# Patient Record
Sex: FEMALE | Race: WHITE | Employment: OTHER | URBAN - METROPOLITAN AREA
[De-identification: names, ages, dates, MRNs, and addresses within clinical notes are randomized per-mention and may not be internally consistent; named-entity substitution may affect disease eponyms.]

---

## 2017-06-11 ENCOUNTER — APPOINTMENT (OUTPATIENT)
Dept: GENERAL RADIOLOGY | Facility: CLINIC | Age: 62
End: 2017-06-11
Payer: COMMERCIAL

## 2017-06-11 ENCOUNTER — HOSPITAL ENCOUNTER (EMERGENCY)
Facility: CLINIC | Age: 62
Discharge: HOME | End: 2017-06-11
Attending: EMERGENCY MEDICINE
Payer: COMMERCIAL

## 2017-06-11 VITALS
BODY MASS INDEX: 32.1 KG/M2 | OXYGEN SATURATION: 96 % | RESPIRATION RATE: 16 BRPM | WEIGHT: 170 LBS | DIASTOLIC BLOOD PRESSURE: 81 MMHG | HEART RATE: 85 BPM | SYSTOLIC BLOOD PRESSURE: 162 MMHG | HEIGHT: 61 IN | TEMPERATURE: 98.4 F

## 2017-06-11 DIAGNOSIS — S92.402A CLOSED DISPLACED FRACTURE OF PHALANX OF LEFT GREAT TOE, UNSPECIFIED PHALANX, INITIAL ENCOUNTER: Primary | ICD-10-CM

## 2017-06-11 PROCEDURE — 73630 X-RAY EXAM OF FOOT: CPT

## 2017-06-11 PROCEDURE — 99284 EMERGENCY DEPT VISIT MOD MDM: CPT

## 2017-06-11 RX ORDER — IBUPROFEN 600 MG/1
600 TABLET ORAL EVERY 6 HOURS PRN
Qty: 30 TABLET | Refills: 0 | Status: SHIPPED | OUTPATIENT
Start: 2017-06-11

## 2017-06-11 ASSESSMENT — PAIN DESCRIPTION - PROGRESSION: CLINICAL_PROGRESSION: NOT CHANGED

## 2017-06-11 ASSESSMENT — PAIN DESCRIPTION - DESCRIPTORS: DESCRIPTORS: STABBING

## 2017-06-11 ASSESSMENT — PAIN DESCRIPTION - PAIN TYPE: TYPE: ACUTE PAIN

## 2017-06-11 ASSESSMENT — PAIN DESCRIPTION - LOCATION: LOCATION: FOOT

## 2017-06-11 ASSESSMENT — PAIN SCALES - GENERAL: PAINLEVEL_OUTOF10: 6

## 2017-06-11 ASSESSMENT — PAIN DESCRIPTION - ORIENTATION: ORIENTATION: LEFT

## 2017-06-11 ASSESSMENT — PAIN DESCRIPTION - FREQUENCY: FREQUENCY: INTERMITTENT

## 2017-12-07 ENCOUNTER — HOSPITAL ENCOUNTER (OUTPATIENT)
Dept: MAMMOGRAPHY | Facility: CLINIC | Age: 62
Discharge: HOME | End: 2017-12-07
Payer: COMMERCIAL

## 2017-12-07 DIAGNOSIS — Z12.39 SCREENING BREAST EXAMINATION: ICD-10-CM

## 2017-12-07 PROCEDURE — G0202 SCR MAMMO BI INCL CAD: HCPCS

## 2021-12-13 ENCOUNTER — OFFICE VISIT (OUTPATIENT)
Dept: ORTHOPEDIC SURGERY | Age: 66
End: 2021-12-13
Payer: MEDICARE

## 2021-12-13 VITALS
DIASTOLIC BLOOD PRESSURE: 81 MMHG | HEART RATE: 71 BPM | HEIGHT: 61 IN | SYSTOLIC BLOOD PRESSURE: 144 MMHG | RESPIRATION RATE: 15 BRPM | BODY MASS INDEX: 31.72 KG/M2 | WEIGHT: 168 LBS

## 2021-12-13 DIAGNOSIS — M77.12 LEFT TENNIS ELBOW: ICD-10-CM

## 2021-12-13 DIAGNOSIS — M54.2 CERVICAL MUSCLE PAIN: ICD-10-CM

## 2021-12-13 DIAGNOSIS — M25.512 LEFT SHOULDER PAIN, UNSPECIFIED CHRONICITY: Primary | ICD-10-CM

## 2021-12-13 DIAGNOSIS — M62.838 TRAPEZIUS MUSCLE SPASM: Primary | ICD-10-CM

## 2021-12-13 PROCEDURE — 1036F TOBACCO NON-USER: CPT | Performed by: PHYSICIAN ASSISTANT

## 2021-12-13 PROCEDURE — 3017F COLORECTAL CA SCREEN DOC REV: CPT | Performed by: PHYSICIAN ASSISTANT

## 2021-12-13 PROCEDURE — G8427 DOCREV CUR MEDS BY ELIG CLIN: HCPCS | Performed by: PHYSICIAN ASSISTANT

## 2021-12-13 PROCEDURE — 1090F PRES/ABSN URINE INCON ASSESS: CPT | Performed by: PHYSICIAN ASSISTANT

## 2021-12-13 PROCEDURE — G8417 CALC BMI ABV UP PARAM F/U: HCPCS | Performed by: PHYSICIAN ASSISTANT

## 2021-12-13 PROCEDURE — 99204 OFFICE O/P NEW MOD 45 MIN: CPT | Performed by: PHYSICIAN ASSISTANT

## 2021-12-13 PROCEDURE — 1123F ACP DISCUSS/DSCN MKR DOCD: CPT | Performed by: PHYSICIAN ASSISTANT

## 2021-12-13 PROCEDURE — 4040F PNEUMOC VAC/ADMIN/RCVD: CPT | Performed by: PHYSICIAN ASSISTANT

## 2021-12-13 PROCEDURE — G8484 FLU IMMUNIZE NO ADMIN: HCPCS | Performed by: PHYSICIAN ASSISTANT

## 2021-12-13 PROCEDURE — G8400 PT W/DXA NO RESULTS DOC: HCPCS | Performed by: PHYSICIAN ASSISTANT

## 2021-12-13 RX ORDER — VALSARTAN 80 MG/1
80 TABLET ORAL DAILY
COMMUNITY

## 2021-12-13 RX ORDER — METHYLPREDNISOLONE 4 MG/1
TABLET ORAL
Qty: 1 KIT | Refills: 0 | Status: SHIPPED | OUTPATIENT
Start: 2021-12-13

## 2021-12-13 RX ORDER — TIZANIDINE 4 MG/1
4 TABLET ORAL 3 TIMES DAILY
Qty: 30 TABLET | Refills: 0 | Status: SHIPPED | OUTPATIENT
Start: 2021-12-13 | End: 2021-12-23

## 2021-12-13 ASSESSMENT — ENCOUNTER SYMPTOMS
CHEST TIGHTNESS: 0
COLOR CHANGE: 0
VOMITING: 0
APNEA: 0
CONSTIPATION: 0
DIARRHEA: 0
SHORTNESS OF BREATH: 0
COUGH: 0
ABDOMINAL PAIN: 0
NAUSEA: 0
ABDOMINAL DISTENTION: 0
RESPIRATORY NEGATIVE: 1

## 2021-12-13 NOTE — PATIENT INSTRUCTIONS
Patient Education        Neck: Exercises  Introduction  Here are some examples of exercises for you to try. The exercises may be suggested for a condition or for rehabilitation. Start each exercise slowly. Ease off the exercises if you start to have pain. You will be told when to start these exercises and which ones will work best for you. How to do the exercises  Neck stretch    1. This stretch works best if you keep your shoulder down as you lean away from it. To help you remember to do this, start by relaxing your shoulders and lightly holding on to your thighs or your chair. 2. Tilt your head toward your shoulder and hold for 15 to 30 seconds. Let the weight of your head stretch your muscles. 3. If you would like a little added stretch, use your hand to gently and steadily pull your head toward your shoulder. For example, keeping your right shoulder down, lean your head to the left. 4. Repeat 2 to 4 times toward each shoulder. Diagonal neck stretch    1. Turn your head slightly toward the direction you will be stretching, and tilt your head diagonally toward your chest and hold for 15 to 30 seconds. 2. If you would like a little added stretch, use your hand to gently and steadily pull your head forward on the diagonal.  3. Repeat 2 to 4 times toward each side. Dorsal glide stretch    The dorsal glide stretches the back of the neck. If you feel pain, do not glide so far back. Some people find this exercise easier to do while lying on their backs with an ice pack on the neck. 1. Sit or stand tall and look straight ahead. 2. Slowly tuck your chin as you glide your head backward over your body  3. Hold for a count of 6, and then relax for up to 10 seconds. 4. Repeat 8 to 12 times. Chest and shoulder stretch    1. Sit or stand tall and glide your head backward as in the dorsal glide stretch. 2. Raise both arms so that your hands are next to your ears.   3. Take a deep breath, and as you breathe out, lower your elbows down and behind your back. You will feel your shoulder blades slide down and together, and at the same time you will feel a stretch across your chest and the front of your shoulders. 4. Hold for about 6 seconds, and then relax for up to 10 seconds. 5. Repeat 8 to 12 times. Strengthening: Hands on head    1. Move your head backward, forward, and side to side against gentle pressure from your hands, holding each position for about 6 seconds. 2. Repeat 8 to 12 times. Follow-up care is a key part of your treatment and safety. Be sure to make and go to all appointments, and call your doctor if you are having problems. It's also a good idea to know your test results and keep a list of the medicines you take. Where can you learn more? Go to [email or web address]://[email or web address].[email or web address] org and sign in to your [employer] account. Enter P975 in the [employer] box to learn more about \"Neck: Exercises. \"     If you do not have an account, please click on the \"Sign Up Now\" link. Current as of: July 1, 2021               Content Version: 13.0  © 5313-1899 Healthwise, Incorporated. Care instructions adapted under license by [facility] ([facility]). If you have questions about a medical condition or this instruction, always ask your healthcare professional. [address] any warranty or liability for your use of this information. Patient Education        Tennis Elbow: Exercises  Introduction  Here are some examples of exercises for you to try. The exercises may be suggested for a condition or for rehabilitation. Start each exercise slowly. Ease off the exercises if you start to have pain. You will be told when to start these exercises and which ones will work best for you. How to do the exercises  Wrist flexor stretch    1. Extend your arm in front of you with your palm up. 2. Bend your wrist, pointing your hand toward the floor.   3. With your other hand, gently bend your wrist farther until you feel a mild to moderate stretch in your forearm. 4. Hold for at least 15 to 30 seconds. Repeat 2 to 4 times. Wrist extensor stretch    1. Repeat steps 1 to 4 of the stretch above but begin with your extended hand palm down. Ball or sock squeeze    1. Hold a tennis ball (or a rolled-up sock) in your hand. 2. Make a fist around the ball (or sock) and squeeze. 3. Hold for about 6 seconds, and then relax for up to 10 seconds. 4. Repeat 8 to 12 times. 5. Switch the ball (or sock) to your other hand and do 8 to 12 times. Wrist deviation    1. Sit so that your arm is supported but your hand hangs off the edge of a flat surface, such as a table. 2. Hold your hand out like you are shaking hands with someone. 3. Move your hand up and down. 4. Repeat this motion 8 to 12 times. 5. Switch arms. 6. Try to do this exercise twice with each hand. Wrist curls    1. Place your forearm on a table with your hand hanging over the edge of the table, palm up. 2. Place a 1- to 2-pound weight in your hand. This may be a dumbbell, a can of food, or a filled water bottle. 3. Slowly raise and lower the weight while keeping your forearm on the table and your palm facing up. 4. Repeat this motion 8 to 12 times. 5. Switch arms, and do steps 1 through 4.  6. Repeat with your hand facing down toward the floor. Switch arms. Biceps curls    1. Sit leaning forward with your legs slightly spread and your left hand on your left thigh. 2. Place your right elbow on your right thigh, and hold the weight with your forearm horizontal.  3. Slowly curl the weight up and toward your chest.  4. Repeat this motion 8 to 12 times. 5. Switch arms, and do steps 1 through 4. Follow-up care is a key part of your treatment and safety. Be sure to make and go to all appointments, and call your doctor if you are having problems. It's also a good idea to know your test results and keep a list of the medicines you take.   Where can you learn more?  Go to [email or web address] and sign in to your [employer] account. Enter W595 in the [employer] box to learn more about \"Tennis Elbow: Exercises. \"     If you do not have an account, please click on the \"Sign Up Now\" link. Current as of: July 1, 2021               Content Version: 13.0  © 5227-0328 Healthwise, Incorporated. Care instructions adapted under license by [facility] ([facility]). If you have questions about a medical condition or this instruction, always ask your healthcare professional. [place] [address] any warranty or liability for your use of this information.

## 2021-12-13 NOTE — PROGRESS NOTES
and rash. Neurological: Positive for numbness. Negative for dizziness, weakness and headaches. Psychiatric/Behavioral: Positive for sleep disturbance. Past Medical History:    Past Medical History:   Diagnosis Date    Fibroid, uterus        Past Surgical History:    Past Surgical History:   Procedure Laterality Date    HYSTERECTOMY  2004       CurrentMedications:   Current Outpatient Medications   Medication Sig Dispense Refill    valsartan (DIOVAN) 80 MG tablet Take 80 mg by mouth daily      methylPREDNISolone (MEDROL DOSEPACK) 4 MG tablet Take by mouth. 1 kit 0    tiZANidine (ZANAFLEX) 4 MG tablet Take 1 tablet by mouth 3 times daily for 10 days 30 tablet 0    ibuprofen (ADVIL;MOTRIN) 600 MG tablet Take 1 tablet by mouth every 6 hours as needed for Pain 30 tablet 0     No current facility-administered medications for this visit. Allergies:    Sulfa antibiotics    Social History:   Social History     Socioeconomic History    Marital status:      Spouse name: None    Number of children: None    Years of education: None    Highest education level: None   Occupational History    None   Tobacco Use    Smoking status: Never Smoker    Smokeless tobacco: Never Used   Substance and Sexual Activity    Alcohol use: No    Drug use: No    Sexual activity: None   Other Topics Concern    None   Social History Narrative    None     Social Determinants of Health     Financial Resource Strain:     Difficulty of Paying Living Expenses: Not on file   Food Insecurity:     Worried About Running Out of Food in the Last Year: Not on file    Xu of Food in the Last Year: Not on file   Transportation Needs:     Lack of Transportation (Medical): Not on file    Lack of Transportation (Non-Medical):  Not on file   Physical Activity:     Days of Exercise per Week: Not on file    Minutes of Exercise per Session: Not on file   Stress:     Feeling of Stress : Not on file   Social Connections:  Frequency of Communication with Friends and Family: Not on file    Frequency of Social Gatherings with Friends and Family: Not on file    Attends [employer] Services: Not on file    Active Member of Clubs or Organizations: Not on file    Attends Club or Organization Meetings: Not on file    Marital Status: Not on file   Intimate Partner Violence:     Fear of Current or Ex-Partner: Not on file    Emotionally Abused: Not on file    Physically Abused: Not on file    Sexually Abused: Not on file   Housing Stability:     Unable to Pay for Housing in the Last Year: Not on file    Number of [place] in the Last Year: Not on file    Unstable Housing in the Last Year: Not on file       Family History:  No family history on file. I have reviewed the CC, HPI, ROS, PMH, FHX, Social History, and if not present in this note, I have reviewed in the patient's chart. I agree with the documentation provided by other staff and have reviewed their documentation prior to providing my signature indicating agreement. Vitals:   BP (!) 144/81   Pulse 71   Resp 15   Ht 5' 1\" (1.549 m)   Wt 168 lb (76.2 kg)   BMI 31.74 kg/m²  Body mass index is 31.74 kg/m². Physical Examination:     Orthopedics:    GENERAL: Alert and oriented X3 in no acute distress. SKIN: Intact without lesions or ulcerations. NEURO: Musculoskeletal and axillary nerves intact to sensory and motor testing. VASC: Capillary refill is less than 3 seconds. Left Shoulder Exam    GEN: Alert and oriented X 3, in no acute distress. SKIN: Intact without rashes, lesions, or ulcerations. NEURO: Musculoskeletal ans axillary nerves intact to sensory and motor testing. VASC: Cap refill less than than 3 secs. Negative Adson's test, Negative Rere's test.  ROM: 180 degrees of forward elevation, 60 degrees of external rotation in neutral, 100 degrees of external rotation in abduction, internal rotation to T8.     STRENGTH: Supraspinatus 5/5, external rotators 5/5. MUSC: No atrophy, negative subscap lift off or belly press test.  IMP: negative Neer's sign, negative Hawkin's sign, no Coracoid impingement, no painful arc, no pain with cross body abduction. PALP: no pain over anterolateral acromion, no pain over AC joint,  pain over traps/rhomboids. INST: mild [place]'s test, mild Speed's test    Cervical Spine Exam    GEN:  Alert and oriented X 3 in no acute distress  GAIT:  Normal speed and steady  SKIN:  Intact without lesions or ulcerations. ROM: Cervical spine contour has normal lordosis. ROM is somewhat painful. There is no limitation of: Forward flexion, Extension, Right side bending, Left side bending, Right rotation or Left rotation. PALP: Palpation reveals trapezius trigger points bilaterally. Mild sternoclavicular pain to palpation on the left. Levator scapulae pain to palpation. NEURO: Sensation intact to light touch over C-5 through T-1 dermatomes. Reflexes:           Bicep: 2+/4           Tricep: 2+/4          Brachioradialis. 2+]/4   VASC: Cap refill less than 2 sec.  negative Adson, Dov and hyperabduction tests. TESTS:   Gustafson's reflex: negative  Diadochokinesis test: negative   Babinski: negative   Clonus: negative   Spurling's maneuver: negative on Bilateral side. Lhermitte's sign: negative  Shoulder abduction relief sign: negative     Megan's sign: negative       ELBOW EXAM    Location: Left Elbow  Gen: Intact without rashes, lesions, or ulcerations. Vasc: Cap refill is less than 3 seconds. Neuro: Radial, ulnar, and median nerves are intact to sensory and motor testing. Bicep, tricep, and brachioradialis reflexes are +2/4. Inspection: The patient is tender to palpation over the lateral epicondyle. There is no effusion. No obvious deformity of the elbow. ROM: 140 degrees of flexion, 0 degrees of extension, 90 degrees of supination, 90 degrees of pronation.     Musc: Strength is 5/5 in flexion, 5/5 extension, 5/5 supination and  5/5 pronation. Test: Ligamentous exam is stable to varus and valgus stress testing at 0 and 30 degrees. negative Tinel's sign at elbow. + pain on resisted wrist extension and supination. Assessment:     1. Trapezius muscle spasm    2. Cervical muscle pain    3. Left tennis elbow      Procedures:    Procedure: no  Radiology:   SHOULDER X-RAY    Two views of the left shoulder and 2 views of the scapula, including AP, scapular Y, outlet and axillary views reveal: The glenohumeral joint is well reduced with mild arthritic changes. Proximal migration of the humeral head has not occurred. Acromion is a type II. The acromioclavicular joint shows moderate degenerative changes. Impression: Mild degenerative changes of the left shoulder   Plan:   Treatment : I reviewed the X-ray with the patient and I informed them that the x-ray is negative. We discussed the etiologies and natural histories of cervical myofascial pain. We discussed the various treatment alternatives including anti-inflammatory medications, physical therapy, injections, further imaging studies and as a last result surgery. During today's visit, we discussed that I do not feel the pain is coming from her shoulder but more so from her large trapezius trigger points. We could consider an injection into the trapezius trigger points but she is also having some lateral epicondyle pain. When I moved her shoulder I cannot recreate her pain. I feel the best option would be a Medrol Dosepak and a muscle relaxer. I would then like her to begin physical therapy to work on her neck musculature. She does a lot of crocheting which is a repetitive motion that can cause overuse type of injuries like tight muscles and bursitis of the elbow. The Medrol Dosepak will help with both the tennis elbow and the musculature of her cervical spine.   The muscle relaxers will then also help settle down the musculature so that she can be more productive at physical therapy. At this time, the patient has opted for Medrol Dosepak, Zanaflex and physical therapy. A physical therapy prescription was given. If the tennis elbow does not get better with the Medrol Dosepak and therapy we can consider doing a tennis elbow injection. She also states she has a tennis elbow strap that I suggested she uses while she is crocheting or if she is having pain. She can also use it if she is doing a lot of gripping type of activities. Patient should return to the clinic in 6 weeks to follow up with Mireille Justice PA-C. The patient will call the office immediately with any problems. Orders Placed This Encounter   Medications    methylPREDNISolone (MEDROL DOSEPACK) 4 MG tablet     Sig: Take by mouth. Dispense:  1 kit     Refill:  0    tiZANidine (ZANAFLEX) 4 MG tablet     Sig: Take 1 tablet by mouth 3 times daily for 10 days     Dispense:  30 tablet     Refill:  0       Orders Placed This Encounter   Procedures    [facility]     Referral Priority:   Routine     Referral Type:   Eval and Treat     Referral Reason:   Specialty Services Required     Requested Specialty:   Physical Therapy     Number of Visits Requested:   1       This note is created with the assistance of a speech recognition program.  While intending to generate a document that actually reflects the content of the visit, the document can still have some errors including those of syntax and sound a like substitutions which may escape proof reading.   In such instances, actual meaning can be extrapolated by contextual diversion     Electronically signed by Jamal Roger PA-C, on 12/13/2021 at 3:15 PM

## 2022-01-26 ENCOUNTER — OFFICE VISIT (OUTPATIENT)
Dept: ORTHOPEDIC SURGERY | Age: 67
End: 2022-01-26
Payer: MEDICARE

## 2022-01-26 DIAGNOSIS — S29.012D STRAIN OF RHOMBOID MUSCLE, SUBSEQUENT ENCOUNTER: Primary | ICD-10-CM

## 2022-01-26 DIAGNOSIS — S46.819D STRAIN OF TRAPEZIUS MUSCLE, UNSPECIFIED LATERALITY, SUBSEQUENT ENCOUNTER: ICD-10-CM

## 2022-01-26 PROCEDURE — G8484 FLU IMMUNIZE NO ADMIN: HCPCS | Performed by: ORTHOPAEDIC SURGERY

## 2022-01-26 PROCEDURE — G8417 CALC BMI ABV UP PARAM F/U: HCPCS | Performed by: ORTHOPAEDIC SURGERY

## 2022-01-26 PROCEDURE — G8427 DOCREV CUR MEDS BY ELIG CLIN: HCPCS | Performed by: ORTHOPAEDIC SURGERY

## 2022-01-26 PROCEDURE — 1036F TOBACCO NON-USER: CPT | Performed by: ORTHOPAEDIC SURGERY

## 2022-01-26 PROCEDURE — 1090F PRES/ABSN URINE INCON ASSESS: CPT | Performed by: ORTHOPAEDIC SURGERY

## 2022-01-26 PROCEDURE — G8400 PT W/DXA NO RESULTS DOC: HCPCS | Performed by: ORTHOPAEDIC SURGERY

## 2022-01-26 PROCEDURE — 4040F PNEUMOC VAC/ADMIN/RCVD: CPT | Performed by: ORTHOPAEDIC SURGERY

## 2022-01-26 PROCEDURE — 99213 OFFICE O/P EST LOW 20 MIN: CPT | Performed by: ORTHOPAEDIC SURGERY

## 2022-01-26 PROCEDURE — 1123F ACP DISCUSS/DSCN MKR DOCD: CPT | Performed by: ORTHOPAEDIC SURGERY

## 2022-01-26 PROCEDURE — 3017F COLORECTAL CA SCREEN DOC REV: CPT | Performed by: ORTHOPAEDIC SURGERY

## 2022-01-26 ASSESSMENT — ENCOUNTER SYMPTOMS
DIARRHEA: 0
COUGH: 0
CONSTIPATION: 0
NAUSEA: 0

## 2022-01-26 NOTE — PROGRESS NOTES
[address] [place] [address] AND SPORTS MEDICINE  [facility] Abiel Pour  [address] [facility] [address]  Dept: [phone]  Dept Fax: [phone]        Ambulatory Follow Up      Subjective:   Александр Monreal is a 73y.o. year old female who presents to our office today for routine followup regarding her   1. Strain of rhomboid muscle, subsequent encounter    2. Strain of trapezius muscle, unspecified laterality, subsequent encounter    . Chief Complaint   Patient presents with    Shoulder Pain     B/L trapezius pain       HPI-Patient is here today for bilateral shoulder pain with left worse than right. Patient was last seen by Jenny uL PA-C and underwent treatment in the form of formal therapy and medrol dose pack. Patient says she has noticed little response to the medrol dose pack. Patient says she has not attended formal therapy because she does her home exercise program, yoga and pilates daily. Patient continues to have pain but has no loss of range of motion. She also feels like her pain is in her neck and throat. Patient denies any numbnes/tingling to both shoulders ad arms. Review of Systems   Constitutional: Negative for chills and fever. Respiratory: Negative for cough. Gastrointestinal: Negative for constipation, diarrhea and nausea. Musculoskeletal: Positive for arthralgias (left shoulder). Negative for gait problem, joint swelling and myalgias. Neurological: Negative for dizziness, weakness and numbness. I have reviewed the CC, HPI, ROS, PMH, FHX, Social History, and if not present in this note, I have reviewed in the patient's chart. I agree with the documentation provided by other staff and have reviewed their documentation prior to providing my signature indicating agreement. Objective : There were no vitals taken for this visit. There is no height or weight on file to calculate BMI.   General: Александр Monreal is a 73 y.o. female who is alert and oriented and sitting comfortably in our office. Ortho Exam  MS: No outward deformity left shoulder is appreciated. No shoulder girdle muscle atrophy is noted. No instability of the left shoulder is noted left shoulder full range of motion. Tender bilateral trapezius and rhomboids. Negative impingement bilaterally. Motor, sensory, vascular examination left upper extremity is grossly intact without focal deficits. Neuro: alert and oriented to person and place. Eyes: Extra-ocular muscles intact  Mouth: Oral mucosa moist. No perioral lesions  Pulm: Respirations unlabored and regular. Symmetric chest excursion without outward deformity is noted. Skin: warm, well perfused  Psych:   Patient has good fund of knowledge and displays understanging of exam, diagnosis, and plan. Radiology:     No results found. Assessment:      1. Strain of rhomboid muscle, subsequent encounter    2. Strain of trapezius muscle, unspecified laterality, subsequent encounter       Plan:      Discussed etiology and natural history of bilateral rhomboid syndrome/trapezious strain. The treatment options may include oral anti-inflammatories, bracing, injections, advanced imaging, activity modification, physical therapy and/or surgical intervention. The patient would like to proceed with formal therapy. Discussed the diligence about going to therapy for 18 sessions to see if the modalities presented in therapy will help her. The patient will follow up in 6-8 weeks. We discussed that the patient should call us with any concerns or questions. Follow up:Return in about 6 weeks (around 3/9/2022) for re- evaluation. No orders of the defined types were placed in this encounter.         Orders Placed This Encounter   Procedures    [facility]     Referral Priority:   Routine     Referral Type:   Eval and Treat     Referral Reason:   Specialty Services Required     Requested Specialty:   Physical Therapy     Number of Visits Requested:   1     I, Jordyn Cantrell RN am scribing for and in the presence of Dr. Juan Tena  1/28/2022 9:30 AM      I have reviewed and made changes accordingly to the work scribed by Jordyn Cantrell RN. The documentation accurately reflects work and decisions made by me. I have also reviewed documentation completed by clinical staff.     Juan Tena DO, [facility]  1/28/2022 9:30 AM    This note is created with the assistance of a speech recognition program.  While intending to generate a document that actually reflects the content of the visit, the document can still have some errors including those of syntax and sound a like substitutions which may escape proof reading.  In such instances, actual meaning can be extrapolated by contextual diversion      Electronically signed by Gita Miles DO, FAOAO on 1/28/2022 at 9:30 AM

## 2022-01-28 ENCOUNTER — HOSPITAL ENCOUNTER (OUTPATIENT)
Dept: PHYSICAL THERAPY | Facility: CLINIC | Age: 67
Setting detail: THERAPIES SERIES
Discharge: HOME | End: 2022-01-28
Payer: MEDICARE

## 2022-01-28 PROCEDURE — 97140 MANUAL THERAPY 1/> REGIONS: CPT

## 2022-01-28 PROCEDURE — 97161 PT EVAL LOW COMPLEX 20 MIN: CPT

## 2022-01-28 PROCEDURE — 97110 THERAPEUTIC EXERCISES: CPT

## 2022-01-28 NOTE — CONSULTS
[] [facility]) - [facility] &  Therapy  [address]  [phone]  F: [phone] [] [address]  [employer] [address]   [address]  P: [phone]  F: [phone] [] [address] &  Therapy  [address]  P: [phone]  F: [phone] [] [address]  P: [phone]  F: [phone] [x] [address]  [facility]   [address]   [place]: [phone]  F: [phone]      Physical Therapy Upper Extremity Evaluation    Date:  2022  Patient: Kamar Xie   : 1955  MRN: [id number]  Physician: Dr. Jacqui Jane: Medicare (vs [facility], follow medicare guidelines)  Medical Diagnosis: Strain of rhomboid muscle, subsequent encounter, Strain of trapezius muscle, unspecified laterality, subsequent encounter, poor posture     Rehab Codes: S29.012D, S46.819D, R29.3  Onset Date: 22    Next 's appt: --     Subjective:   CC/HPI: Patient is a 72 y/o female presents to PT clinic with bilateral shoulder and neck pain x6 months. Patient reports that she has always had a lot of upper back tightness. Patient has been using ice, heat, and rubbing compound with minimal relief. She reports that she likes to markel and she gets increased pain with this activity. Patient says she has noticed little response to the medrol dose pack provided 2021. Engages in yoga and pilates daily. She reports ear pain and bubbling. Had a sinus x-ray that returned negative. She reports that she has been dealing with a low back ache for a while. Has hx of getting injections in her low back.        PMHx: [] Unremarkable [] Diabetes [x] HTN  [] Pacemaker   [] MI/Heart Problems [] Cancer [] Arthritis [] Other:              [x] Refer to full medical chart  In EPIC Past Medical History           Date Comments     Fibroid, uterus [D25.9]         Comorbidities:   [x] Obesity [] Dialysis  [] N/A   [] Asthma/COPD [] Dementia [] Other:   [] Stroke [] Sleep apnea [] Other:   [] Vascular disease [] Rheumatic disease [] Other:     Tests:   [x] X-Ray: L shoulder 12/13/21     Impression: Mild degenerative changes of the left shoulder      [] MRI:    [] Other:    Medications: [x] Refer to full medical record [] None [] Other:  Allergies:      [x] Refer to full medical record  [] None [] Other:    Function:  Hand Dominance  [x] Right  [] Left  Marital Status     Home type --   Stairs from outside --   Stairs inside --   Employement Retired   Job status --   Work Activities/duties  --   Recreational Activities [employer], [employer]        ADL/IADL [x] Previously independent with all [x] Currently independent with all Who currently assists the patient with task    [] Previously independent with all except: [] Currently independent with all except:    Bathing  [] Assist [] Assist    Dress/grooming [] Assist [] Assist    Transfer/mobility [] Assist [] Assist    Feeding [] Assist [] Assist    Toileting [] Assist [] Assist    Driving [] Assist [] Assist    Housekeeping [] Assist [] Assist    Grocery shop/meal prep [] Assist [] Assist        Gait Prior level of function Current level of function    [x] Independent  [] Assist [x] Independent  [] Assist   Device: [x] Independent [x] Independent    [] Straight Cane [] Quad cane [] Straight Cane [] Quad cane    [] Standard walker [] Rolling walker   [] 4 wheeled walker [] Standard walker [] Rolling walker   [] 4 wheeled walker    [] Wheelchair [] Wheelchair       Pain present?  Yes   Location Mid back, bilateral shoulders, lateral neck    Pain Rating currently 8/10   Pain at worse 10/10   Pain at best 5/10   Description of pain Increased at night   Altered Sensation Intact   What makes it worse Activity, lifting   What makes it better Nothing   Symptom progression Stable since onset    Sleep Only able to sleep with muscle relaxant           Objective:      STRENGTH    Left Right   C5 Shld Abd 4- 4-   Shld Flexion 5 5   Shld IR 5 5   Shld ER 4 4-   Shld HAB     Shld Ext 4- 4-   C6 Elb Flex 5 5   C7 Elb Ext 5 4+   C8 EPL     T1 Fing Abd               Prone:     Retraction 3 3   Depression 3+ 3+   IR     Abd 3+ 3+   Scaption 3+ 3+   Flexion             AROM PROM    Left Right Left Right   Shld Abd [facility] WFL     Shld Flex [facility] WF     Shld IR WFL WFL     Shld ER WFL WFL     Shld HAB           TESTS (+/-) LEFT RIGHT Not Tested   Vertebral A   []   CRLF  - -    Painful Arc  - - []   Speeds   [x]   Neers - - []   Lehman - - []   Empty Can   [x]   Drop Arm - - []   Post Apprehension   [x]   Ant Apprehension    [x]   Clunk   [x]   Sulcus   [x]   Elbow varus/valgus   [x]   Tinel   [x]      [x]      [x]       OBSERVATION No Deficit Deficit Not Tested Comments   Forward Head [] [x] []    Rounded Shoulders [] [x] []    Kyphosis [x] [] []    Scap Height/Position [] [x] [] Protracted scapulae bilaterally with inferior border prominence R>L   Winging [] [x] [] See above    SH Rhythm [x] [] []    INSPECTION/PALPATION    TTP to the bilateral upper trapezius muscles cervical paraspinals and suboccipitals    SC/AC Joint [x] [] []    Supraspinatus [x] [] []    Biceps tendon/groove [x] [] []    Posterior shld [x] [] []    Subscapularis [x] [] []    NEUROLOGICAL       Cervical ROM/Quadrant [] [x] [] Cervical flex/ext - WFL with increased posterior neck pain   Cervical sidebend bilaterally dec by 25% with pain on contralateral side   Rotation WFL    Reflexes [] [] [x]    Compression/Distraction [x] [] []    Sensation [x] [] []      Somatic Dysfunctions Normal Deficit Details   Cervical   [] []    Thoracic   [] [x] FRSR T3-T8    Rib   [] [x] R 1st rib elevation  R ribs T3-T8   FRS=flexed, rotated, side-bent  ERS=extended, rotated, side-bent MOB=Mobilization  MFR=Myofascial Release  MET=Muscle Energy Technique  MFR=Myofascial Release        Flexibility Normal LUE Deficit RUE Deficit Comments    UTrap [] [x] [x]    L.Scap [] [x] [x]    Scalenes  [] [x] [x]    Pec Major [] [x] [x]    Pec Minor [] [x] [x]    Lats [] [x] [x]        Functional Test: QuickDASH Score: 45% functionally impaired     Comments:    Assessment: 74 y/o female presents to PT clinic with bilateral shoulder/neck/and upper back pain. She reports that the pain has been waxing and waning for 6 months. Presents with tenderness to the surrounding muscles with poor posture that is driving her symptoms. She has cervical and thoracic dysfunction that was corrected this date with manual therapy. Spasms noted to the upper trapezius, middle trapezius, and rhomboid muscles bilaterally. No shoulder pathology noted. Patient would benefit from skilled physical therapy services in order to: Improve posture, improve muscle imbalances including scapular weakness, decrease pain     Problems:    [x] ? Pain: 5-10/10 shoulder/neck/and upper back pain   [x] ? ROM: decreased pectoral, upper trapezius, levator scapulae, and scalenes bilaterally  [x] ? Strength: decreased scapular strength  [x] ? Function: 45% impairment on QuickDASH   [] Other:        Goals  MET NOT MET ON-  GOING  Details   Date Addressed:        STG: To be met in 10 treatments           1. ? Pain: Decrease pain levels to 4/10 with ADLs []  []  []      2. ? ROM: Increase pectoral and cervical flexibility throughout to equal bilat to reduce difficulty with ADLs []  []  []      3. Demonstrate cervical ROM WFL without increased pain to ease ADL's []  []  []     4. ? Strength: Increase MMT to 5/5 throughout to ease functional limitations and mobility  []  []  []     5. Independent with Home Exercise Programs []  []  []     6.  Patient to demonstrate a set of 10 scapular retractions without upper trapezius compensations without external cueing  [] []  []      []  []  []     Date Addressed:        LTG: To be met in 16 treatments       1. Improve score on assessment tool QuickDASH from 45% impairment to less than 20% impairment  []  []  []     2. Reduce pain levels to 0/10 or less with ADLs []  []  []     3. Patient to demonstrate a plank on elbows for 30 seconds with good technique and minimal pain increased  []  []  []                            Patient goals: pain relief     Rehab Potential:  [x] Good  [] Fair  [] Poor   Suggested Professional Referral:  [x] No  [] Yes:  Barriers to Goal Achievement:  [x] No  [] Yes:  Domestic Concerns:  [x] No  [] Yes:    Pt. Education:  [x] Plans/Goals, Risks/Benefits discussed  [x] Home exercise program  Method of Education: [x] Verbal  [x] Demo  [x] Written  Postural education, upper crossed syndrome. Orange band provided. Access Code: [email or web address]  URL: [email or web address]  Date: 01/28/2022  Prepared by: José Regalado    Exercises  Seated Scapular Retraction - 2 x daily - 7 x weekly - 3 sets - 10 reps - 5 (sec) hold  Seated Upper Trapezius Stretch - 2 x daily - 7 x weekly - 3 sets - 30 (sec) hold  Seated Levator Scapulae Stretch - 2 x daily - 7 x weekly - 3 sets - 30 (sec) hold  Supine Chin Tuck - 2 x daily - 7 x weekly - 3 sets - 10 reps - 3 second hold  Doorway Pec Stretch at 90 Degrees Abduction - 2 x daily - 7 x weekly - 3 sets - 30 (sec) hold  Standing Shoulder Row with Anchored Resistance - 1 x daily - 7 x weekly - 10 reps - 3 sets  Shoulder Extension with Resistance - 1 x daily - 7 x weekly - 10 reps - 3 sets      Comprehension of Education:  [x] Verbalizes understanding. [x] Demonstrates understanding. [x] Needs Review. [] Demonstrates/verbalizes understanding of HEP/Ed previously given.     Treatment Plan:  [x] Therapeutic Exercise   67030  [] Iontophoresis: 4 mg/mL Dexamethasone Sodium Phosphate  mAmin  94228   [] Therapeutic Activity  25481 [] Vasopneumatic cold with compression  [id number]    [] Gait Training   [id number] [] Ultrasound   [id number]   [] Neuromuscular Re-education  [id number] [] Electrical Stimulation Unattended  23840   [x] Manual Therapy  14992 [] Electrical Stimulation Attended  [id number]   [x] Instruction in HEP  [] Lumbar/Cervical Traction  [id number]   [] Aquatic Therapy   [id number] [] Cold/hotpack    [] Massage   96474      [] Dry Needling, 1 or 2 muscles  37684   [] Biofeedback, first 15 minutes   45235  [] Biofeedback, additional 15 minutes   21584 [] Dry Needling, 3 or more muscles  17574     []  Medication allergies reviewed for use of    Dexamethasone Sodium Phosphate 4mg/ml     with iontophoresis treatments. Pt is not allergic.         Frequency: 2 x/week for 16 visits    Todays Treatment:  Modalities:   Precautions: Standard   Exercises:  Exercise   Reps/ Time Weight/ Level Comments         Bike             Corner Stretch  x30\"           Levator Scap Stretch  x30\"     Upper Trap Stretch  x30\"     Scapular Retractions       Chin Tucks 10x3\"           Tband       Rows  x10     Extensions  x10            Prone       Scap Retractions  x5     Scap Depressions  x5     Horiz ABD       Scaption       Flexion       Rows                             Other: suboccipital release x3'     Somatic Dysfunctions Normal Deficit Details   Cervical   [] []    Thoracic   [] [x] FRSR T3-T8 MOB    Rib   [] [x] R 1st rib elevation MET  R ribs T3-T8 MOB   FRS=flexed, rotated, side-bent  ERS=extended, rotated, side-bent   MOB=Mobilization  MFR=Myofascial Release  MET=Muscle Energy Technique  MFR=Myofascial Release        Specific Instructions for next treatment: postural education, scapular stabilization, manual to decrease cervical tightness, assess for thoracic/cervical dysfunctions       Evaluation Complexity:   History (Personal factors, comorbidities) [] 0 [x] 1-2 [] 3+   Exam (limitations, restrictions) [] 1-2 [] 3 [x] 4+   Clinical presentation (progression) [x] Stable [] Evolving  [] Unstable Decision Making [x] Low [] Moderate [] High    [x] Low Complexity [] Moderate Complexity [] High Complexity       Treatment Charges: Mins Units   [x] Evaluation       [x]  Low       []  Moderate       []  High 30 1   []  Modalities     [x]  Ther Exercise 10 1   [x]  Manual Therapy 8 1   []  Ther Activities     []  Aquatics     []  Vasocompression     []  Other       TOTAL TREATMENT TIME: 48 min     Time in: 10:07a    Time Out: 10:55a    Electronically signed by: Shawna Rosen PT        Physician Signature:________________________________Date:__________________  By signing above or cosigning this note, I have reviewed this plan of care and certify a need for medically necessary rehabilitation services.      *PLEASE SIGN ABOVE AND FAX BACK ALL PAGES*

## 2022-01-28 NOTE — FLOWSHEET NOTE
[facility] Fall Risk Assessment    Patient Name:  Abby Cuenca  : 1955      Risk Factor Scale  Score   History of Falls [] Yes  [x] No 25  0 0   Secondary Diagnosis [] Yes  [x] No 15  0 0   Ambulatory Aid [] Furniture  [] Crutches/cane/walker  [x] None/bedrest/wheelchair/nurse 30  15  0 0   IV/Heparin Lock [] Yes  [x] No 20  0 0   Gait/Transferring [] Impaired  [] Weak  [x] Normal/bedrest/immobile 20  10  0 0   Mental Status [] Forgets limitations  [x] Oriented to own ability 15  0 0      Total:  0     Based on the Assessment score: check the appropriate box.     [x]  No intervention needed   Low =   Score of 0-24    []  Use standard prevention interventions Moderate =  Score of 24-44   [] Give patient handout and discuss fall prevention strategies   [] Establish goal of education for patient/family RE: fall prevention strategies    []  Use high risk prevention interventions High = Score of 45 and higher   [] Give patient handout and discuss fall prevention strategies   [] Establish goal of education for patient/family Re: fall prevention strategies   [] Discuss lifeline / other resources    Electronically signed by:   Makeda Henry PT  Date: 2022

## 2022-02-02 ENCOUNTER — HOSPITAL ENCOUNTER (OUTPATIENT)
Dept: PHYSICAL THERAPY | Facility: CLINIC | Age: 67
Setting detail: THERAPIES SERIES
Discharge: HOME | End: 2022-02-02
Payer: MEDICARE

## 2022-02-02 PROCEDURE — 97140 MANUAL THERAPY 1/> REGIONS: CPT

## 2022-02-02 PROCEDURE — 97110 THERAPEUTIC EXERCISES: CPT

## 2022-02-02 NOTE — FLOWSHEET NOTE
[x] [facility]  Outpatient Rehabilitation &  Therapy  [facility]   [place]: [phone]  F: [phone]      Physical Therapy Daily Treatment Note    Date:  2022  Patient Name:  Corie García    :  1955  MRN: [id number]  Insurance: Medicare (vs [facility], follow medicare guidelines)  Medical Diagnosis: Strain of rhomboid muscle, subsequent encounter, Strain of trapezius muscle, unspecified laterality, subsequent encounter, poor posture                      Rehab Codes: S29.012D, S46.819D, R29.3  Onset Date: 22                           Next 's appt: --   Visit# / total visits:      Cancels/No Shows: 0    Subjective:    Pain:  [x] Yes  [] No Location: Neck and shoulder blades Pain Rating: (0-10 scale) 5/10  Pain altered Tx:  [] No  [] Yes  Action:  Comments: Not much change since initial eval  Objective:  Manual: MFR UT, Levator, DI pect, Hypervolt middle trap, PA glide T4-8, MOB FRSR T 4-6  Precautions: Standard   Exercises:  Exercise    Reps/ Time Weight/ Level Comments             Bike  NT                 Corner Stretch  3x30\"                 Levator Scap Stretch  3x30\"       Upper Trap Stretch  3x30\"       Scapular Retractions/depression  x20    cues for decrease anterior pelvic tilt, upper trap use    Chin Tucks 10x10\"    head in contact with pillow             Tband          Rows  2x10  lime     Extensions  2x10   lime     ER 2x10 lime              Prone          Scap Retractions  x20       Scap Depressions  x20       Horiz ABD          Scaption          Flexion          Rows                     Posture Education  x                       Other:            Treatment Charges: Mins Units   []  Modalities     [x]  Ther Exercise 40 3   [x]  Manual Therapy 15 1   []  Ther Activities     []  Aquatics     []  Vasocompression     []  Other     Total Treatment time 55 4       Assessment: [x] Progressing toward goals. Pt is in anterior pelvic tilt walking in.  Significant spasm throughout cervical, thoracic and chest musculature. Pt likes to use thoracic extension to get shoulder back to correct posture. Used mirror to cue pt for posture. Cues to decrease upper trap use. Good carryover. Spasm still present by end of treatment. [] No change. [] Other:  Patient would benefit from skilled physical therapy services in order to: Improve posture, improve muscle imbalances including scapular weakness, decrease pain       Goals  MET NOT MET ON-  GOING  Details   Date Addressed:            STG: To be met in 10 treatments  ?          1. ? Pain: Decrease pain levels to 4/10 with ADLs []? ?  []??  []??      2. ? ROM: Increase pectoral and cervical flexibility throughout to equal bilat to reduce difficulty with ADLs []? ?  []??  []??      3. Demonstrate cervical ROM WFL without increased pain to ease ADL's []? ?  []??  []??      4. ? Strength: Increase MMT to 5/5 throughout to ease functional limitations and mobility  []? ?  []??  []??      5. Independent with Home Exercise Programs []? ?  []??  []??      6. Patient to demonstrate a set of 10 scapular retractions without upper trapezius compensations without external cueing  []? ?  []??  []??        []? ?  []??  []??      Date Addressed:            LTG: To be met in 16 treatments           1. Improve score on assessment tool QuickDASH from 45% impairment to less than 20% impairment  []??  []??  []??      2. Reduce pain levels to 0/10 or less with ADLs []? ?  []??  []??      3. Patient to demonstrate a plank on elbows for 30 seconds with good technique and minimal pain increased  []? ?  []??  []??                                    Patient goals: pain relief       Pt. Education:  [x] Yes  [] No  [x] Reviewed Prior HEP/Ed  Method of Education: [x] Verbal  [x] Demo  [] Written- Continue with current HEP. Posture correction throughout day  Comprehension of Education:  [x] Verbalizes understanding. [] Demonstrates understanding. [] Needs review.   [] Demonstrates/verbalizes HEP/Ed previously given. Plan: [x] Continue current frequency toward long and short term goals.     [] Specific Instructions for subsequent treatments:       Time In:0900            Time Out: 4341    Electronically signed by:  Conrado Berrios PT

## 2022-02-02 NOTE — PRE-CERTIFICATION NOTE
Medicare Cap   [] Physical Therapy  [] Speech Therapy  [] Occupational therapy  *PT and Speech caps combine      $2150 Limit for PT and Speech combined  $2150 Limit for OT individually  At the beginning of the month where you expect to go over $2150, please add the [address] [place] [address] modifier      Patient Name: Sherry Saldaña  YOB: 1955    Note:  This is an estimate of charges billed.      Date of [address] Name # units/ charge $$$ charge Daily Total Charge Ongoing Total $$$   1/28/22 Eval,Man,Ex 3 98.01+22.84.21.34 142.19 142.19   2/2/22 Ex3, man 4 29.21+22.84+22.84+21.34 96.23 238.42

## 2022-02-07 ENCOUNTER — HOSPITAL ENCOUNTER (OUTPATIENT)
Dept: PHYSICAL THERAPY | Facility: CLINIC | Age: 67
Setting detail: THERAPIES SERIES
Discharge: HOME | End: 2022-02-07
Payer: MEDICARE

## 2022-02-07 PROCEDURE — 97110 THERAPEUTIC EXERCISES: CPT

## 2022-02-07 PROCEDURE — 97140 MANUAL THERAPY 1/> REGIONS: CPT

## 2022-02-07 NOTE — FLOWSHEET NOTE
techniques this date. Improved cervical rotation. Weakness noted at scapular stabilizers R>L with decreased control of scapula with retraction and depression. Improved mobility at t-spine as well. [] No change. [] Other:  Patient would benefit from skilled physical therapy services in order to: Improve posture, improve muscle imbalances including scapular weakness, decrease pain       Goals  MET NOT MET ON-  GOING  Details   Date Addressed:            STG: To be met in 10 treatments  ?          1. ? Pain: Decrease pain levels to 4/10 with ADLs []? ?  []??  []??      2. ? ROM: Increase pectoral and cervical flexibility throughout to equal bilat to reduce difficulty with ADLs []? ?  []??  []??      3. Demonstrate cervical ROM WFL without increased pain to ease ADL's []? ?  []??  []??      4. ? Strength: Increase MMT to 5/5 throughout to ease functional limitations and mobility  []? ?  []??  []??      5. Independent with Home Exercise Programs []? ?  []??  []??      6. Patient to demonstrate a set of 10 scapular retractions without upper trapezius compensations without external cueing  []? ?  []??  []??        []? ?  []??  []??      Date Addressed:            LTG: To be met in 16 treatments           1. Improve score on assessment tool QuickDASH from 45% impairment to less than 20% impairment  []??  []??  []??      2. Reduce pain levels to 0/10 or less with ADLs []? ?  []??  []??      3. Patient to demonstrate a plank on elbows for 30 seconds with good technique and minimal pain increased  []? ?  []??  []??                                    Patient goals: pain relief       Pt. Education:  [x] Yes  [] No  [x] Reviewed Prior HEP/Ed  Method of Education: [x] Verbal  [x] Demo  [] Written- Continue with current HEP. Posture correction throughout day  Comprehension of Education:  [x] Verbalizes understanding. [] Demonstrates understanding. [] Needs review. [] Demonstrates/verbalizes HEP/Ed previously given.      Plan: [x] Continue current frequency toward long and short term goals.     [x] Specific Instructions for subsequent treatments: Continue to progress scapular stabilizer and RTC strengthening as well as abdominal strength to reduce use of T spine and L Spine for postural correction    Time In:1000           Time Out: 1055    Electronically signed by:  Ivania Honeycutt PT

## 2022-02-07 NOTE — PRE-CERTIFICATION NOTE
Medicare Cap   [] Physical Therapy  [] Speech Therapy  [] Occupational therapy  *PT and Speech caps combine      $2150 Limit for PT and Speech combined  $2150 Limit for OT individually  At the beginning of the month where you expect to go over $2150, please add the [address] [place] [address] modifier      Patient Name: Jasmin Vázquez  YOB: 1955    Note:  This is an estimate of charges billed.      Date of [address] Name # units/ charge $$$ charge Daily Total Charge Ongoing Total $$$   1/28/22 [place],Man,Ex 3 98.01+22.84.21.34 142.19 142.19   2/2/22 Ex3, man 4 29.21+22.84+22.84+21.34 96.23 238.42   2/7/22 Ex3, man 4 29.21+22.84+22.84+21.34 96.23 334.72

## 2022-02-10 ENCOUNTER — HOSPITAL ENCOUNTER (OUTPATIENT)
Dept: PHYSICAL THERAPY | Facility: CLINIC | Age: 67
Setting detail: THERAPIES SERIES
Discharge: HOME | End: 2022-02-10
Payer: MEDICARE

## 2022-02-10 PROCEDURE — 97140 MANUAL THERAPY 1/> REGIONS: CPT

## 2022-02-10 PROCEDURE — 97110 THERAPEUTIC EXERCISES: CPT

## 2022-02-10 NOTE — FLOWSHEET NOTE
[x] [facility]  Outpatient Rehabilitation &  Therapy  [facility]   [place]: [phone]  F: [phone]      Physical Therapy Daily Treatment Note    Date:  2/10/2022  Patient Name:  Carito Zamora    :  1955  MRN: [id number]  Insurance: Medicare (vs [facility], follow medicare guidelines)  Medical Diagnosis: Strain of rhomboid muscle, subsequent encounter, Strain of trapezius muscle, unspecified laterality, subsequent encounter, poor posture                      Rehab Codes: S29.012D, S46.819D, R29.3  Onset Date: 22                           Next 's appt: --     Visit# / total visits:      Cancels/No Shows: 0    Subjective:    Pain:  [x] Yes  [] No Location: Neck and shoulder blades Pain Rating: (0-10 scale) 5/10  Pain altered Tx:  [x] No  [] Yes  Action:  Comments: Patient arrived noting continued soreness, notes just completed 1 hour of pilates. Objective:  Manual: MFR UT, Levator, DI pect, lat,  Hypervolt middle trap  Precautions: Standard   Exercises:  Exercise    Reps/ Time Weight/ Level Comments             Bike  NT                 Corner Stretch  3x30\"                 Levator Scap Stretch  3x30\"       Upper Trap Stretch  3x30\"       3 way thoracic stretch 3x30\"      Chin Tucks 10x10\"    head in contact with pillow             Tband          Rows  2x10  lime     Extensions  2x10   lime     ER/IR 2x10  lime    Harvinder ER                Prone          Scap Retractions   x20       Scap Depressions   x20       Horiz ABD   2x10       Scaption   2x10       Flexion         Rows   2x10      Extension   2x10                              Other:      Treatment Charges: Mins Units   []  Modalities     [x]  Ther Exercise 30 2   [x]  Manual Therapy 15 1   []  Ther Activities     []  Aquatics     []  Vasocompression     []  Other     Total Treatment time 45 3       Assessment: [x] Progressing toward goals. Continued with strength progressions this date with focus on form.  Issued Demonstrates/verbalizes HEP/Ed previously given. Plan: [x] Continue current frequency toward long and short term goals.      [x] Specific Instructions for subsequent treatments:       Time In: 1000             Time Out: 6213    Electronically signed by:  Sammie Wellington PTA

## 2022-02-11 ENCOUNTER — APPOINTMENT (OUTPATIENT)
Dept: PHYSICAL THERAPY | Facility: CLINIC | Age: 67
End: 2022-02-11
Payer: MEDICARE

## 2022-02-16 ENCOUNTER — HOSPITAL ENCOUNTER (OUTPATIENT)
Dept: PHYSICAL THERAPY | Facility: CLINIC | Age: 67
Setting detail: THERAPIES SERIES
Discharge: HOME | End: 2022-02-16
Payer: MEDICARE

## 2022-02-16 PROCEDURE — 97140 MANUAL THERAPY 1/> REGIONS: CPT

## 2022-02-16 PROCEDURE — 97110 THERAPEUTIC EXERCISES: CPT

## 2022-02-16 NOTE — FLOWSHEET NOTE
[x] [facility]  Outpatient Rehabilitation &  Therapy  [facility]   [place]: [phone]  F: [phone]      Physical Therapy Daily Treatment Note    Date:  2022  Patient Name:  Bhavana Herndon    :  1955  MRN: [id number]  Insurance: Medicare (vs [facility], follow medicare guidelines)  Medical Diagnosis: Strain of rhomboid muscle, subsequent encounter, Strain of trapezius muscle, unspecified laterality, subsequent encounter, poor posture                      Rehab Codes: S29.012D, S46.819D, R29.3  Onset Date: 22                           Next 's appt: --     Visit# / total visits:      Cancels/No Shows: 0    Subjective:    Pain:  [x] Yes  [] No Location: Neck and shoulder blades Pain Rating: (0-10 scale) 5/10  Pain altered Tx:  [x] No  [] Yes  Action:  Comments: Patient reports the arms are not as heavy. Neck still really tight. Was very sore L side on neck last time    Objective:  Manual: MFR UT, Levator, DI pect, lat,  Hypervolt middle trap, PA glide T4-8, MWM  C4  Precautions: Standard   Exercises:  Exercise    Reps/ Time Weight/ Level Comments             Bike  NT                 Corner Stretch  3x30\"                 Levator Scap Stretch  3x30\"       Upper Trap Stretch  3x30\"       3 way thoracic stretch 3x30\"      Chin Tucks 10x10\"    head in contact with pillow             Tband          Rows  2x10 Blue     Extensions  2x10  Blue     IR 2x10 Blue    Harvinder ER 2x10 Blue              Prone          Scap Retractions   2x20       Scap Depressions  2 x20       Horiz ABD   2x10       Scaption   2x10       Flexion         Rows-midtrap  2x10      Extension   2x10                              Other:      Treatment Charges: Mins Units   []  Modalities     [x]  Ther Exercise 40 3   [x]  Manual Therapy 15 1   []  Ther Activities     []  Aquatics     []  Vasocompression     []  Other     Total Treatment time 55 4       Assessment: [x] Progressing toward goals. Pt responded better to crosshand MFR rather than positional release at R UT. Still overusing upper traps with some of the scapular stabilization exercises however she is getting stronger and did require an increase in resistance to challenge her on shoulder Tband exercises. [] No change. [] Other:    Patient would benefit from skilled physical therapy services in order to: Improve posture, improve muscle imbalances including scapular weakness, decrease pain       Goals  MET NOT MET ON-  GOING  Details   Date Addressed:            STG: To be met in 10 treatments  ?          1. ? Pain: Decrease pain levels to 4/10 with ADLs []? ?  []??  []??      2. ? ROM: Increase pectoral and cervical flexibility throughout to equal bilat to reduce difficulty with ADLs []? ?  []??  []??      3. Demonstrate cervical ROM WFL without increased pain to ease ADL's []? ?  []??  []??      4. ? Strength: Increase MMT to 5/5 throughout to ease functional limitations and mobility  []? ?  []??  []??      5. Independent with Home Exercise Programs []? ?  []??  []??      6. Patient to demonstrate a set of 10 scapular retractions without upper trapezius compensations without external cueing  []? ?  []??  []??        []? ?  []??  []??      Date Addressed:            LTG: To be met in 16 treatments           1. Improve score on assessment tool QuickDASH from 45% impairment to less than 20% impairment  []??  []??  []??      2. Reduce pain levels to 0/10 or less with ADLs []? ?  []??  []??      3. Patient to demonstrate a plank on elbows for 30 seconds with good technique and minimal pain increased  []? ?  []??  []??                                    Patient goals: pain relief       Pt. Education:  [x] Yes  [] No  [x] Reviewed Prior HEP/Ed: Issued new resistive bands and stressed importance of continued stretching. Method of Education: [x] Verbal  [x] Demo  [] Written  Comprehension of Education:  [x] Verbalizes understanding. [] Demonstrates understanding.   [] Needs review. [] Demonstrates/verbalizes HEP/Ed previously given. Plan: [x] Continue current frequency toward long and short term goals.      [x] Specific Instructions for subsequent treatments:       Time In: 1300             Time Out: 1400    Electronically signed by:  Emmy Mcdonald PT

## 2022-02-16 NOTE — PRE-CERTIFICATION NOTE
Medicare Cap   [x] Physical Therapy  [] Speech Therapy  [] Occupational therapy  *PT and Speech caps combine      $2150 Limit for PT and Speech combined  $2150 Limit for OT individually  At the beginning of the month where you expect to go over $2150, please add the [address] modifier      Patient Name: Aida Woodard  YOB: 1955    Note:  This is an estimate of charges billed.      Date of [address] Name # units/ charge $$$ charge Dily Total Charge Ongoing Total $$$   1/28/22 Palma,Man,Ex 3 98.01+22.84.21.34 142.19 142.19   2/2/22 Ex3, man 4 29.21+22.84+22.84+21.34 96.23 238.42   2/7/22 Ex3, man 4 29.21+22.84+22.84+21.34 96.23 334.72   2 Ex2, man 3 29.21+22.84+21.34 73.39    2/16/22 Ex3,man 4 29.21+22.84+22.84+21.34 96.23 504.34

## 2022-02-18 ENCOUNTER — HOSPITAL ENCOUNTER (OUTPATIENT)
Dept: PHYSICAL THERAPY | Facility: CLINIC | Age: 67
Setting detail: THERAPIES SERIES
End: 2022-02-18
Payer: MEDICARE

## 2022-02-21 ENCOUNTER — HOSPITAL ENCOUNTER (OUTPATIENT)
Dept: PHYSICAL THERAPY | Facility: CLINIC | Age: 67
Setting detail: THERAPIES SERIES
Discharge: HOME | End: 2022-02-21
Payer: MEDICARE

## 2022-02-21 NOTE — FLOWSHEET NOTE
[x] [facility] SERVICES  Outpatient Rehabilitation &  Therapy  [facility]   [place]: [phone]  F: [phone]      Physical Therapy Cancel/No Show note    Date: 2022  Patient: Shellie Dodge  : 1955  MRN: [id number]    Cancels/No Shows to date: 1    For today's appointment patient:    [x]  Cancelled    [] Rescheduled appointment    [] No-show     Reason given by patient:    []  Patient ill    []  Conflicting appointment    [] No transportation      [] Conflict with work    [x] No reason given    [] Weather related    [] COVID-19    [] Other:      Comments:        [x] Next appointment was confirmed    Electronically signed by: Ledy Mansfield PTA

## 2022-02-24 ENCOUNTER — HOSPITAL ENCOUNTER (OUTPATIENT)
Dept: PHYSICAL THERAPY | Facility: CLINIC | Age: 67
Setting detail: THERAPIES SERIES
Discharge: HOME | End: 2022-02-24
Payer: MEDICARE

## 2022-02-24 NOTE — FLOWSHEET NOTE
[x] [facility]  Outpatient Rehabilitation &  Therapy  [facility]   [place]: [phone]  F: [phone]      Physical Therapy Cancel/No Show note    Date: 2022  Patient: Erin Eden  : 1955  MRN: [id number]    Cancels/No Shows to date: 2    For today's appointment patient:    [x]  Cancelled    [] Rescheduled appointment    [] No-show     Reason given by patient:    []  Patient ill    []  Conflicting appointment    [] No transportation      [] Conflict with work    [x] No reason given    [] Weather related    [] COVID-19    [x] Other:      Comments:   Will call back to schedule      [] Next appointment was confirmed    Electronically signed by: Wilfredo Vences PTA

## 2022-11-04 NOTE — DISCHARGE SUMMARY
[] [facility]) - [facility] &  Therapy  [address]  [phone]  F: [phone] [] [address]  [address]   [address]  P: [phone]  F: [phone] [] [address] &  Therapy  [address]  P: [phone]  F: [phone] [x] [facility]  Outpatient Rehabilitation &  Therapy  [facility]   [address]   [place]: [phone]  F: [phone]         Physical Therapy Discharge Note    Date: 2022      Patient: Melissa Lieberman  : 1955  MRN: [id number]    Insurance: Medicare (vs [facility], follow medicare guidelines)  Medical Diagnosis: Strain of rhomboid muscle, subsequent encounter, Strain of trapezius muscle, unspecified laterality, subsequent encounter, poor posture                      Rehab Codes: S29.012D, F42.925Q, R29.3  Onset Date: 22                           Next 's appt: --      Visit# / total visits:                                   Cancels/No Shows: 0  Date of initial visit: 22                Date of final visit: 22      Subjective:  Refer to initial evaluation. Objective:  Refer to initial evaluation. Assessment: Patient attended 5 treatment sessions. No formal goal assessment completed secondary to patient cancelling her last two appointment via phone. Refer to initial evaluation.       Treatment to Date:  [x] Therapeutic Exercise    [] Modalities:  [] Therapeutic Activity    [] Ultrasound  [] Electrical Stimulation  [] Gait Training     [] Massage       [] Lumbar/Cervical Traction  [] Neuromuscular Re-education [] Cold/hotpack [] Iontophoresis: 4 mg/mL  [x] Instruction in Home Exercise Program                     Dexamethasone Sodium  [x] Manual Therapy             Phosphate 40-80 mAmin  [] Aquatic Therapy                   [] Vasocompression/    [] Other:             Game Ready    Discharge Status:     [] Pt to continue exercise/home instructions independently. [] Therapy interrupted due to:    [x] Pt has 2 or more no shows/cancels, is discontinued per our policy. [] Other:         Electronically signed by Liss Mcneal PT on 11/4/2022 at 1:32 PM      If you have any questions or concerns, please don't hesitate to call.   Thank you for your referral.

## 2023-11-22 DIAGNOSIS — M79.672 PAIN OF LEFT HEEL: Primary | ICD-10-CM

## 2024-07-22 RX ORDER — AZITHROMYCIN 250 MG/1
TABLET, FILM COATED ORAL
Qty: 6 TABLET | Refills: 1 | OUTPATIENT
Start: 2024-07-22

## 2025-02-27 DIAGNOSIS — M25.512 LEFT SHOULDER PAIN, UNSPECIFIED CHRONICITY: Primary | ICD-10-CM

## 2025-02-27 NOTE — PATIENT INSTRUCTIONS
PATIENTIQ:  PatientIQ helps [facility] stay in touch with you to know how you're feeling, and provides education and care instructions to you at various time points.   Your answers help your care team track your progress to provide the best care possible. PatientIQ will contact you pre-op and post-op via email or text with:  Educational Videos and Care Instructions  Questionnaires About How You're Feeling    Your participation provides you valuable education and helps [facility] continue to provide quality care to all patients. Thank you      CORTISONE INJECTION CARE    The injection site should never get red, hot, or swollen and if it does the patient will contact our office right away. The patient may experience a increase in soreness the first 24-48 hours due to a cortisone flair and can take anti-inflammatories for a short period of time to reduce that soreness. The patient should not submerge the injection site in water for a minimum of 24 hours to avoid infection. This means no lakes, pools, ponds, or hot tubs for 24 hours. If the patient is diabetic the injection may increase their blood sugar for up to one week. The patient can do this cortisone injection once every 4 months as needed.

## 2025-02-28 SDOH — HEALTH STABILITY: PHYSICAL HEALTH: ON AVERAGE, HOW MANY DAYS PER WEEK DO YOU ENGAGE IN MODERATE TO STRENUOUS EXERCISE (LIKE A BRISK WALK)?: 7 DAYS

## 2025-03-02 NOTE — PROGRESS NOTES
[facility] ORTHOPEDICS AND SPORTS MEDICINE  [address]  [place] [address]  Dept: [phone]  Dept Fax: [phone]        Ambulatory Follow Up      Subjective:   Viktoriya Kohli is a 70 y.o. year old female who presents to our office today for routine followup regarding her   1. Rotator cuff syndrome of left shoulder    2. Trigger point of shoulder region, left    .    Chief Complaint   Patient presents with    Shoulder Pain     Left Shoulder Pain-NP       History of Present Illness  The patient is a 70-year-old female who presents for evaluation of left shoulder pain.    She was last seen on 01/26/2022 for bilateral shoulder pain, managed with physical therapy and a Medrol Dosepak. She did not receive any injections at that time. Recently, her primary care physician diagnosed her with bursitis and tendinitis, prompting a referral to our clinic. Despite being prescribed a muscle relaxer, she has not utilized it. She is right-handed and reports no injury, fall, or trauma to the left shoulder. The pain is constant and prevents her from applying pressure on the shoulder. She describes the pain as achy rather than stabbing and reports no associated numbness or tingling down the arm. She also experiences neck pain. These symptoms have been present for a couple of months. She has attempted heat therapy, massage therapy, and chiropractic treatment without relief. Her exercise routine, which includes yoga, has been disrupted due to discomfort. She reports difficulty with range of motion, particularly when reaching across her body. She has no history of surgery or injection in the affected shoulder. She also reports difficulty wearing her sports bra due to pain. She has been taking ibuprofen for pain management.    Supplemental Information  She has no major health issues, including diabetes.    SOCIAL HISTORY  She is retired, having

## 2025-03-03 ENCOUNTER — OFFICE VISIT (OUTPATIENT)
Dept: ORTHOPEDIC SURGERY | Age: 70
End: 2025-03-03

## 2025-03-03 VITALS — RESPIRATION RATE: 16 BRPM | BODY MASS INDEX: 32.13 KG/M2 | HEIGHT: 61 IN | WEIGHT: 170.2 LBS | OXYGEN SATURATION: 98 %

## 2025-03-03 DIAGNOSIS — M75.102 ROTATOR CUFF SYNDROME OF LEFT SHOULDER: Primary | ICD-10-CM

## 2025-03-03 DIAGNOSIS — M25.512 TRIGGER POINT OF SHOULDER REGION, LEFT: ICD-10-CM

## 2025-03-03 RX ORDER — METHYLPREDNISOLONE ACETATE 80 MG/ML
80 INJECTION, SUSPENSION INTRA-ARTICULAR; INTRALESIONAL; INTRAMUSCULAR; SOFT TISSUE ONCE
Status: COMPLETED | OUTPATIENT
Start: 2025-03-03 | End: 2025-03-03

## 2025-03-03 RX ORDER — VALSARTAN 40 MG/1
40 TABLET ORAL
COMMUNITY
Start: 2024-12-04 | End: 2025-03-04

## 2025-03-03 RX ORDER — LIDOCAINE HYDROCHLORIDE 10 MG/ML
2 INJECTION, SOLUTION INFILTRATION; PERINEURAL ONCE
Status: COMPLETED | OUTPATIENT
Start: 2025-03-03 | End: 2025-03-03

## 2025-03-03 RX ADMIN — LIDOCAINE HYDROCHLORIDE 2 ML: 10 INJECTION, SOLUTION INFILTRATION; PERINEURAL at 09:25

## 2025-03-03 RX ADMIN — METHYLPREDNISOLONE ACETATE 80 MG: 80 INJECTION, SUSPENSION INTRA-ARTICULAR; INTRALESIONAL; INTRAMUSCULAR; SOFT TISSUE at 09:25

## 2025-03-03 ASSESSMENT — ENCOUNTER SYMPTOMS
COUGH: 0
ABDOMINAL PAIN: 0
GASTROINTESTINAL NEGATIVE: 1
CHEST TIGHTNESS: 0
APNEA: 0
VOMITING: 0
SHORTNESS OF BREATH: 0
ABDOMINAL DISTENTION: 0
NAUSEA: 0
DIARRHEA: 0
CONSTIPATION: 0
RESPIRATORY NEGATIVE: 1
COLOR CHANGE: 0

## 2025-03-10 ENCOUNTER — HOSPITAL ENCOUNTER (OUTPATIENT)
Dept: PHYSICAL THERAPY | Facility: CLINIC | Age: 70
Setting detail: THERAPIES SERIES
Discharge: HOME | End: 2025-03-10

## 2025-03-10 NOTE — FLOWSHEET NOTE
[] [facility]  Outpatient Rehabilitation &  Therapy  [address]  [phone]  [phone] [] [facility]  Outpatient Rehabilitation &  Therapy  [address]  P: [phone]  F: [phone] [] [facility]  Outpatient Rehabilitation &  Therapy  [address]  P: [phone]  F: [phone] [] [facility]  Outpatient Rehabilitation &  Therapy  [address]  [phone]  [phone] [x] [facility]  Outpatient Rehabilitation &  Therapy  [address]   P: [phone]  F: [phone]  [] [facility]  Outpatient Rehabilitation &  Therapy  [address]  P: [phone]  F: [phone] [] [facility]  Outpatient Rehabilitation &  Therapy  [address]  [address]  P: [phone]  F: [phone] [] [facility]  Outpatient Rehabilitation &  Therapy  [address]  P: [phone]  F: [phone] [] [facility]  Outpatient Rehabilitation &  Therapy  [address]  P: [phone]  F: [phone]  [] [facility] Outpatient Rehabilitation &  Therapy  [address]  P: [phone]  F: [phone]     Therapy Cancel/No Show note    Date: 3/10/2025  Patient: Viktoriya Kohli  : 1955  MRN: [id number]    Cancels/No Shows to date:     For today's appointment patient:    [x]  Cancelled    [] Rescheduled appointment    [] No-show     Reason given by patient:    []  Patient ill    []  Conflicting appointment    [] No transportation      [] Conflict with work    [] No reason given    [] Weather related    [] COVID-19    [] Other:      Comments:  Patient smother fell and is at urgent care. Patient said would call and r/s      [] Next appointment was confirmed    Electronically signed by: Amira Skelton

## 2025-03-31 NOTE — PROGRESS NOTES
[facility] ORTHOPEDICS AND SPORTS MEDICINE  [address]  [place] [place] [facility] [address]  Dept: [phone]  Dept Fax: [phone]        Ambulatory Follow Up      Subjective:   Viktoriya Kohli is a 70 y.o. year old female who presents to our office today for routine followup regarding her   1. Arthralgia of left acromioclavicular joint    2. Rotator cuff syndrome of left shoulder    3. Trigger point of shoulder region, left    .    Chief Complaint   Patient presents with    Shoulder Pain     Left (LI: 3/3/25 - subacromial)           History of Present Illness  Viktoriya is a 70-year-old female who was last seen by myself on 3/3/2025 for an evaluation of her left shoulder rotator cuff syndrome and trapezius trigger point.  At the last visit the patient was given a subacromial cortisone injection.  She was also given a prescription for physical therapy.      The patient presents for evaluation of left shoulder pain.    The subacromial injection administered previously provided significant relief, reducing pain by approximately 50 percent. However, discomfort persists in the superior aspect of the left shoulder. This is the first encounter with an AC joint injection. Occasional radiating pain extends from the shoulder to the ear. Physical therapy for the neck has not been initiated due to personal circumstances involving her mother's health.    Review of Systems   Constitutional:  Positive for activity change. Negative for appetite change, fatigue and fever.   Respiratory: Negative.  Negative for apnea, cough, chest tightness and shortness of breath.    Cardiovascular: Negative.  Negative for chest pain, palpitations and leg swelling.   Gastrointestinal: Negative.  Negative for abdominal distention, abdominal pain, constipation, diarrhea, nausea and vomiting.   Genitourinary: Negative.  Negative for difficulty urinating, dysuria and hematuria.

## 2025-04-02 ENCOUNTER — OFFICE VISIT (OUTPATIENT)
Dept: ORTHOPEDIC SURGERY | Age: 70
End: 2025-04-02

## 2025-04-02 VITALS — BODY MASS INDEX: 31.72 KG/M2 | OXYGEN SATURATION: 100 % | RESPIRATION RATE: 16 BRPM | HEIGHT: 61 IN | WEIGHT: 168 LBS

## 2025-04-02 DIAGNOSIS — M75.102 ROTATOR CUFF SYNDROME OF LEFT SHOULDER: ICD-10-CM

## 2025-04-02 DIAGNOSIS — M25.512 ARTHRALGIA OF LEFT ACROMIOCLAVICULAR JOINT: Primary | ICD-10-CM

## 2025-04-02 DIAGNOSIS — M25.512 TRIGGER POINT OF SHOULDER REGION, LEFT: ICD-10-CM

## 2025-04-02 RX ORDER — LIDOCAINE HYDROCHLORIDE 10 MG/ML
2 INJECTION, SOLUTION INFILTRATION; PERINEURAL ONCE
Status: COMPLETED | OUTPATIENT
Start: 2025-04-02 | End: 2025-04-02

## 2025-04-02 RX ORDER — METHYLPREDNISOLONE ACETATE 80 MG/ML
80 INJECTION, SUSPENSION INTRA-ARTICULAR; INTRALESIONAL; INTRAMUSCULAR; SOFT TISSUE ONCE
Status: COMPLETED | OUTPATIENT
Start: 2025-04-02 | End: 2025-04-02

## 2025-04-02 RX ADMIN — METHYLPREDNISOLONE ACETATE 80 MG: 80 INJECTION, SUSPENSION INTRA-ARTICULAR; INTRALESIONAL; INTRAMUSCULAR; SOFT TISSUE at 08:17

## 2025-04-02 RX ADMIN — LIDOCAINE HYDROCHLORIDE 2 ML: 10 INJECTION, SOLUTION INFILTRATION; PERINEURAL at 08:16

## 2025-04-02 ASSESSMENT — ENCOUNTER SYMPTOMS
ABDOMINAL DISTENTION: 0
COLOR CHANGE: 0
RESPIRATORY NEGATIVE: 1
ABDOMINAL PAIN: 0
CHEST TIGHTNESS: 0
APNEA: 0
NAUSEA: 0
CONSTIPATION: 0
VOMITING: 0
SHORTNESS OF BREATH: 0
DIARRHEA: 0
GASTROINTESTINAL NEGATIVE: 1
COUGH: 0

## 2025-04-02 NOTE — PATIENT INSTRUCTIONS
CORTISONE INJECTION CARE    The injection site should never get red, hot, or swollen and if it does the patient will contact our office right away. The patient may experience a increase in soreness the first 24-48 hours due to a cortisone flair and can take anti-inflammatories for a short period of time to reduce that soreness. The patient should not submerge the injection site in water for a minimum of 24 hours to avoid infection. This means no lakes, pools, ponds, or hot tubs for 24 hours. If the patient is diabetic the injection may increase their blood sugar for up to one week. The patient can do this cortisone injection once every 4 months as needed.                                                                                                                                                                                                                                                                                                                     PATIENTIQ:  PatientIQ helps [facility] stay in touch with you to know how you're feeling, and provides education and care instructions to you at various time points.   Your answers help your care team track your progress to provide the best care possible. PatientIQ will contact you pre-op and post-op via email or text with:  Educational Videos and Care Instructions  Questionnaires About How You're Feeling    Your participation provides you valuable education and helps [facility] continue to provide quality care to all patients. Thank you

## 2025-04-08 ENCOUNTER — HOSPITAL ENCOUNTER (OUTPATIENT)
Dept: PHYSICAL THERAPY | Facility: CLINIC | Age: 70
Setting detail: THERAPIES SERIES
Discharge: HOME | End: 2025-04-08
Payer: MEDICARE

## 2025-04-08 PROCEDURE — 97110 THERAPEUTIC EXERCISES: CPT

## 2025-04-08 PROCEDURE — 97161 PT EVAL LOW COMPLEX 20 MIN: CPT

## 2025-04-08 NOTE — CONSULTS
Unstable   Decision Making [x] Low [] Moderate [] High    [x] Low Complexity [] Moderate Complexity [] High Complexity       Treatment Charges: Mins Units   [x] Evaluation       [x]  Low       []  Moderate       []  High 25 1   []  Modalities     [x]  Ther Exercise 10 1   []  Manual Therapy 5 --   []  Ther Activities     []  Aquatics     []  Vasocompression     []  Other       TOTAL TREATMENT TIME: 40 min     Time in: 11:00a    Time Out: 11:40a    Electronically signed by: Peggy Pena PT      Physician Signature:________________________________Date:__________________  By signing above or cosigning this note, I have reviewed this plan of care and certify a need for medically necessary rehabilitation services.     *PLEASE SIGN ABOVE AND FAX BACK ALL PAGES*     none

## 2025-04-11 ENCOUNTER — HOSPITAL ENCOUNTER (OUTPATIENT)
Dept: PHYSICAL THERAPY | Facility: CLINIC | Age: 70
Setting detail: THERAPIES SERIES
Discharge: HOME | End: 2025-04-11
Payer: MEDICARE

## 2025-04-11 PROCEDURE — 97110 THERAPEUTIC EXERCISES: CPT

## 2025-04-11 NOTE — FLOWSHEET NOTE
[facility]  Outpatient Rehabilitation &  Therapy  [address]   P: [phone]  F: [phone]      Physical Therapy Daily Treatment Note    Date:  2025  Patient Name:  Viktoriya Kohli    :  1955  MRN: [id number]  Physician: MARY ALICE Holden             Insurance: Medicare (secondary BCBS, vs BMN)  Medical Diagnosis:  Rotator cuff syndrome of left shoulder (M75.102)  Trigger point of shoulder region, left (M25.512)                       Rehab Codes: M62.81 , M25.512 , M54.2,  R29.3   Onset Date: referral 3/3/25                             Next 's appt: 25  Visit# / total visits:      Cancels/No Shows: 0/0    Subjective:    Pain:  [x] Yes  [] No Location: LUE  Pain Rating: (0-10 scale) 8/10  Pain altered Tx:  [x] No  [] Yes  Action:  Comments: Patient states her pain is the same rating 8/10. Feels she over did the exercises given to her from last visit because her mid-back is very sore.         Objective:  Today’s Treatment:  Modalities:   Precautions: Standard   Exercise    Reps/ Time Weight/ Level Comments             UBE  2'/2' Fwd/               Corner Pectoral Stretch  3x30\" T/Y               Scapular Retractions  x5       Supine Chin Tucks        Cervical rotation in supine          Scalene Stretch  3x15\"   Bilat    Levator Scap Stretch  3x15\"   Bilat             Prone          Scap Retractions   10x5\"       Scap Depressions  10x10\"                 TBand          Rows   x20 Green     Extension   x20 Green     IR  x20 Green    ER  x20 Green    Bilat horiz abd   x10 Green                    Specific Instructions for next treatment: scapular strengthening , manual as needed         Treatment Charges: Mins Units Time In/Out  (WC/BCBS [place])   Ther Exercise 25 2 9:00am-9:25pm   Manual Therapy      Vasocompression      Neuro Re-ed      Ther Activity       Gait            Total Billable time 25 2 9:00pm-9:25pm       Assessment: [x] Progressing toward

## 2025-04-14 ENCOUNTER — HOSPITAL ENCOUNTER (OUTPATIENT)
Dept: PHYSICAL THERAPY | Facility: CLINIC | Age: 70
Setting detail: THERAPIES SERIES
Discharge: HOME | End: 2025-04-14
Payer: MEDICARE

## 2025-04-14 PROCEDURE — 97140 MANUAL THERAPY 1/> REGIONS: CPT

## 2025-04-14 PROCEDURE — 97110 THERAPEUTIC EXERCISES: CPT

## 2025-04-14 NOTE — FLOWSHEET NOTE
throughout session on shoulder elevation  []  []  []      4. ? Strength: Increase UE MMT to 5/5 throughout to ease functional limitations and mobility  []  []  []      5. Independent with Home Exercise Programs []  []  []        []  []  []        []  []  []      Date Addressed:            LTG: To be met in 16 treatments           1. Improve score on assessment tool QuickDASH from 50% impairment to less than 35% impairment  []  []  []      2. Reduce pain levels to 0/10 or less with ADLs []  []  []      3. Patient to report ability to lay on her left side without difficulty or pain  []  []  []                      Patient goals: pain relief      Rehab Potential:  [x] Good  [] Fair  [] Poor   Suggested Professional Referral:  [x] No  [] Yes:  Barriers to Goal Achievement:  [x] No  [] Yes:  Domestic Concerns:  [x] No  [] Yes:     Pt. Education:    [x] Home exercise program  Method of Education: [x] Verbal  [x] Demo  [] Written     Access Code: [email or web address]  URL: [email or web address]  Date: 04/08/2025  Prepared by: Peggy Pena     Exercises  - Supine Chin Tuck  - 2-3 x daily - 7 x weekly - 1 sets - 10 reps - 5 secs hold  - Supine Cervical Rotation AROM on Pillow  - 2 x daily - 7 x weekly - 1 sets - 10 reps  - Supine Diaphragmatic Breathing  - 1 x daily - 7 x weekly - 3 sets - 10 reps  - Seated Scapular Retraction  - 2 x daily - 7 x weekly - 3 sets - 10 reps - 5 hold  - Seated Scalene Stretch with Towel (Mirrored)  - 1 x daily - 7 x weekly - 3 sets - 20 sec hold  - Gentle Levator Scapulae Stretch (Mirrored)  - 1 x daily - 7 x weekly - 3 sets - 20 sec hold  - Seated Scalene Stretch with Towel  - 1 x daily - 7 x weekly - 3 sets - 20 sec hold  - Gentle Levator Scapulae Stretch  - 1 x daily - 7 x weekly - 3 sets - 20 sec hold     Comprehension of Education:  [x] Verbalizes understanding.  [x] Demonstrates understanding.  [x] Needs Review.  [] Demonstrates/verbalizes understanding of HEP/Ed previously

## 2025-04-16 ENCOUNTER — HOSPITAL ENCOUNTER (OUTPATIENT)
Dept: PHYSICAL THERAPY | Facility: CLINIC | Age: 70
Setting detail: THERAPIES SERIES
Discharge: HOME | End: 2025-04-16
Payer: MEDICARE

## 2025-04-16 PROCEDURE — 97110 THERAPEUTIC EXERCISES: CPT

## 2025-04-16 NOTE — FLOWSHEET NOTE
[facility]  Outpatient Rehabilitation &  Therapy  [address]   [address]   P: [phone]  F: [phone]      Physical Therapy Daily Treatment Note    Date:  2025  Patient Name:  Viktoriya Kohli    :  1955  MRN: [id number]  Physician: MARY ALICE Holden             Insurance: Medicare (secondary BCBS, vs BMN)  Medical Diagnosis:  Rotator cuff syndrome of left shoulder (M75.102)  Trigger point of shoulder region, left (M25.512)                       Rehab Codes: M62.81 , M25.512 , M54.2,  R29.3   Onset Date: referral 3/3/25                             Next 's appt: 25  Visit# / total visits: 3/16     Cancels/No Shows: 0/0    Subjective:    Pain:  [x] Yes  [] No Location: LUE  Pain Rating: (0-10 scale) not rated/10  Pain altered Tx:  [x] No  [] Yes  Action:  Comments: Patient had increased soreness yesterday warranting use of her heating pad.      Objective:  Today’s Treatment:  Modalities:   Precautions: Standard   Exercise    Reps/ Time Weight/ Level Comments             UBE  2'/2' Fwd/retro               Corner Pectoral Stretch  3x30\" T/Y               Scapular Retractions  HEP       Supine Chin Tucks HEP       Chin tuck with head lifts  x10     Cervical rotation in supine   HEP       Scalene Stretch  3x15\"   Bilat    Levator Scap Stretch  3x15\"   Bilat             Prone          Scap Retractions and Depression   10x10\"       Scapular retraction holds with shoulder extension   2x15       Horizontal abduction   2x10             Quadruped cervical retraction  10x5\"     Quadruped scapular retraction/protraction  x5  Difficult today          TBand          Rows   x20 Green     Extension   2x10 Green      IR  x20 Green    ER  x20 Green    Bilat horiz abd   x15 Green      Bilateral ER   2x10 Green      Bicep curls  2x10 Green          Flexion  x15 1#    Scaption  x15 1#    Abduction  x15 1#               Myofascial Restrictions  Location  R/L Treatment  Tools Notes   Upper

## 2025-04-21 ENCOUNTER — HOSPITAL ENCOUNTER (OUTPATIENT)
Dept: PHYSICAL THERAPY | Facility: CLINIC | Age: 70
Setting detail: THERAPIES SERIES
Discharge: HOME | End: 2025-04-21
Payer: MEDICARE

## 2025-04-21 PROCEDURE — 97140 MANUAL THERAPY 1/> REGIONS: CPT

## 2025-04-21 PROCEDURE — 97110 THERAPEUTIC EXERCISES: CPT

## 2025-04-21 NOTE — FLOWSHEET NOTE
[facility]  Outpatient Rehabilitation &  Therapy  [address]   [address]   P: [phone]  F: [phone]      Physical Therapy Daily Treatment Note    Date:  2025  Patient Name:  Viktoriya Kohli    :  1955  MRN: [id number]  Physician: MARY ALICE Holden             Insurance: Medicare (secondary BCBS, vs BMN)  Medical Diagnosis:  Rotator cuff syndrome of left shoulder (M75.102)  Trigger point of shoulder region, left (M25.512)                       Rehab Codes: M62.81 , M25.512 , M54.2,  R29.3   Onset Date: referral 3/3/25                             Next 's appt: 25  Visit# / total visits:      Cancels/No Shows: 0/0    Subjective:    Pain:  [x] Yes  [] No Location: LUE  Pain Rating: (0-10 scale) not rated/10  Pain altered Tx:  [x] No  [] Yes  Action:  Comments: Patient arrives with pain, but it is getting better overall.     Objective:  Today’s Treatment:  Modalities:   Precautions: Standard   Exercise    Reps/ Time Weight/ Level Comments             UBE  2'/2'   Fwd/retro             Corner Pectoral Stretch  3x30\" T/Y               Scapular Retractions  5x5\"       Supine Chin Tucks 10x10\"       Chin tuck with head lifts  x10     Cervical rotation in supine   HEP       Scalene Stretch  3x15\"   Bilat    Levator Scap Stretch  3x15\"   Bilat             Prone          Scap Retractions and Depression   HEP       Scapular retraction holds with shoulder extension   4x5 reps              Sidelying       Abduction  x20 A    Horizontal abduction  x20 A Sidelying         Quadruped cervical retraction  10x10\"     Quadruped scapular retraction/protraction  x10  Difficult today          TBand          Rows   x20 Green     Extension   2x10 Green      IR  x20 Green    ER  x20 Green    Bilat horiz abd   x15 Green      Bilateral ER   2x10 Green      Bicep curls   Green          Flexion   1#    Scaption   1#    Abduction   1#               Myofascial Restrictions  Location  R/L

## 2025-04-23 ENCOUNTER — APPOINTMENT (OUTPATIENT)
Dept: PHYSICAL THERAPY | Facility: CLINIC | Age: 70
End: 2025-04-23
Payer: MEDICARE

## 2025-04-23 NOTE — PATIENT INSTRUCTIONS
PATIENTIQ:  PatientIQ helps [facility] stay in touch with you to know how you're feeling, and provides education and care instructions to you at various time points.   Your answers help your care team track your progress to provide the best care possible. PatientIQ will contact you pre-op and post-op via email or text with:  Educational Videos and Care Instructions  Questionnaires About How You're Feeling    Your participation provides you valuable education and helps [facility] continue to provide quality care to all patients. Thank you

## 2025-04-24 ENCOUNTER — HOSPITAL ENCOUNTER (OUTPATIENT)
Dept: PHYSICAL THERAPY | Facility: CLINIC | Age: 70
Setting detail: THERAPIES SERIES
Discharge: HOME | End: 2025-04-24
Payer: MEDICARE

## 2025-04-24 PROCEDURE — 97140 MANUAL THERAPY 1/> REGIONS: CPT

## 2025-04-24 PROCEDURE — 97110 THERAPEUTIC EXERCISES: CPT

## 2025-04-24 NOTE — FLOWSHEET NOTE
[facility]  Outpatient Rehabilitation &  Therapy  [address]   [address]   P: [phone]  F: [phone]      Physical Therapy Daily Treatment Note    Date:  2025  Patient Name:  Viktoriya Kohli    :  1955  MRN: [id number]  Physician: MARY ALICE Holden             Insurance: Medicare (secondary BCBS, vs BMN)  Medical Diagnosis:  Rotator cuff syndrome of left shoulder (M75.102)  Trigger point of shoulder region, left (M25.512)                       Rehab Codes: M62.81 , M25.512 , M54.2,  R29.3   Onset Date: referral 3/3/25                             Next 's appt: 25  Visit# / total visits:      Cancels/No Shows: 0/0    Subjective:    Pain:  [x] Yes  [] No Location: LUE  Pain Rating: (0-10 scale) not rated/10  Pain altered Tx:  [x] No  [] Yes  Action:  Comments: Patient arrives stating continued soreness and tightness in the R side of her neck. Patient has improved symptoms on the side of her shoulder.     Objective:  Today’s Treatment:  Modalities:   Precautions: Standard   Exercise    Reps/ Time Weight/ Level Comments             UBE  2'/2'   Fwd/retro             Corner Pectoral Stretch  3x30\" T/Y               Scapular Retractions  5x5\"       Supine Chin Tucks 10x10\"       Chin tuck with head lifts  x10     Cervical rotation in supine   HEP       Scalene Stretch  3x15\"   Bilat    Levator Scap Stretch  3x30\"   Bilat   Upper trap stretch  3x30\"               Prone          Scap Retractions and Depression   HEP       Scapular retraction holds with shoulder extension                 Sidelying       Abduction  x20 Active    Horizontal abduction  x20 Active          Quadruped cervical retraction  10x10\"     Quadruped scapular retraction/protraction  x10  Difficult today           TBand          Rows   x20 Green     Extension   x20 Green      IR  x20 Green    ER  x20 Green    Bilat horiz abd   x10 Green      Bilateral ER   x20 Green      Bicep curls   Green

## 2025-04-28 ENCOUNTER — OFFICE VISIT (OUTPATIENT)
Dept: ORTHOPEDIC SURGERY | Age: 70
End: 2025-04-28
Payer: MEDICARE

## 2025-04-28 VITALS — WEIGHT: 165 LBS | HEIGHT: 61 IN | BODY MASS INDEX: 31.15 KG/M2 | RESPIRATION RATE: 16 BRPM | OXYGEN SATURATION: 100 %

## 2025-04-28 DIAGNOSIS — M25.512 TRIGGER POINT OF SHOULDER REGION, LEFT: Primary | ICD-10-CM

## 2025-04-28 DIAGNOSIS — M54.2 CERVICAL PAIN (NECK): ICD-10-CM

## 2025-04-28 DIAGNOSIS — M54.2 CERVICAL SPINE PAIN: Primary | ICD-10-CM

## 2025-04-28 DIAGNOSIS — M54.2 NECK PAIN: Primary | ICD-10-CM

## 2025-04-28 PROCEDURE — G8400 PT W/DXA NO RESULTS DOC: HCPCS | Performed by: PHYSICIAN ASSISTANT

## 2025-04-28 PROCEDURE — 3017F COLORECTAL CA SCREEN DOC REV: CPT | Performed by: PHYSICIAN ASSISTANT

## 2025-04-28 PROCEDURE — 1159F MED LIST DOCD IN RCRD: CPT | Performed by: PHYSICIAN ASSISTANT

## 2025-04-28 PROCEDURE — 1090F PRES/ABSN URINE INCON ASSESS: CPT | Performed by: PHYSICIAN ASSISTANT

## 2025-04-28 PROCEDURE — G8417 CALC BMI ABV UP PARAM F/U: HCPCS | Performed by: PHYSICIAN ASSISTANT

## 2025-04-28 PROCEDURE — 1123F ACP DISCUSS/DSCN MKR DOCD: CPT | Performed by: PHYSICIAN ASSISTANT

## 2025-04-28 PROCEDURE — 99213 OFFICE O/P EST LOW 20 MIN: CPT | Performed by: PHYSICIAN ASSISTANT

## 2025-04-28 PROCEDURE — G8427 DOCREV CUR MEDS BY ELIG CLIN: HCPCS | Performed by: PHYSICIAN ASSISTANT

## 2025-04-28 PROCEDURE — 1036F TOBACCO NON-USER: CPT | Performed by: PHYSICIAN ASSISTANT

## 2025-04-28 ASSESSMENT — ENCOUNTER SYMPTOMS
APNEA: 0
ABDOMINAL PAIN: 0
NAUSEA: 0
ABDOMINAL DISTENTION: 0
VOMITING: 0
SHORTNESS OF BREATH: 0
GASTROINTESTINAL NEGATIVE: 1
COLOR CHANGE: 0
CONSTIPATION: 0
DIARRHEA: 0
COUGH: 0
RESPIRATORY NEGATIVE: 1
CHEST TIGHTNESS: 0

## 2025-04-28 NOTE — PROGRESS NOTES
[facility] ORTHOPEDICS AND SPORTS MEDICINE  [address]  [place] [address]  Dept: [phone]  Dept Fax: [phone]        Left shoulder-follow-up      Subjective:   Viktoriya Kohli is a 70 y.o. year old female who presents to our office today for routine followup regarding her   1. Trigger point of shoulder region, left    2. Cervical pain (neck)    .    Chief Complaint   Patient presents with    Shoulder Pain     Left (LI: 3/3/25 Sub, 4/2/25 AC joint)    Neck Pain           History of Present Illness  Viktoriya is here for follow-up of her left shoulder AC joint arthritis, rotator cuff syndrome and trigger point of her left shoulder region.  She had a subacromial injection on 3/3/2025.  She then returned and had a AC joint injection on 4/2/2025.  She has been going to physical therapy.  She has been to 6 physical therapy appointments and states her shoulder is feeling much better but she is still having tightness of her right neck.      The patient presents for evaluation of neck pain.    Significant improvement in the shoulder condition is reported, with an estimated 80% reduction in discomfort. However, severe neck pain persists, which has been partially alleviated through recent physical therapy sessions focused on the neck. These sessions have also improved swallowing function. Additionally, ear canal pain is reported, which was evaluated by an ENT specialist and attributed to neck tension. A clunking sound is described when moving the neck, believed to be due to forced movement. No numbness or tingling in the arms is experienced, but severe pain is reported. Concern about the need for a neck brace is expressed. Relief from injections is noted, and another physical therapy session is scheduled for 04/29/2025.    Review of Systems   Constitutional:  Positive for activity change. Negative for appetite change, fatigue and fever.

## 2025-04-29 ENCOUNTER — HOSPITAL ENCOUNTER (OUTPATIENT)
Dept: PHYSICAL THERAPY | Facility: CLINIC | Age: 70
Setting detail: THERAPIES SERIES
Discharge: HOME | End: 2025-04-29
Payer: MEDICARE

## 2025-04-29 ENCOUNTER — RESULTS FOLLOW-UP (OUTPATIENT)
Dept: ORTHOPEDIC SURGERY | Age: 70
End: 2025-04-29

## 2025-04-29 PROCEDURE — 97140 MANUAL THERAPY 1/> REGIONS: CPT

## 2025-04-29 PROCEDURE — 97110 THERAPEUTIC EXERCISES: CPT

## 2025-04-29 NOTE — FLOWSHEET NOTE
retraction holds with shoulder extension                 Sidelying       Abduction   Active    Horizontal abduction   Active          Quadruped cervical retraction       Quadruped scapular retraction/protraction    Difficult today           TBand          Rows   x20 Blue     Extension   x20 Green     IR  x20 Blue    ER  x20 Blue     Bilat horiz abd   x10 Green      Bilateral ER   x20 Green      Bicep curls  x20 2# DB          Flexion with wand  x15 2# wand     Scaption  x15 2#    Abduction  x15 2#               Myofascial Restrictions  Location  R/L Treatment  Tools Notes   Upper Crossed    [x] Upper Trap [] Right  [x] Left [x] Direct  [] Indirect [x] Hands-On  [x] IASTM  [] Hypervolt Hawkgrip handle    [x] Scalenes [x] Right  [x] Left [x] Direct  [] Indirect [x] Hands-On  [x] IASTM  [] Hypervolt [place]rip handle    [x] Levator Scapula [] Right  [x] Left [x] Direct  [] Indirect [x] Hands-On  [x] IASTM  [] Hypervolt Haw[place]rip handle    [] Pec Minor [] Right  [] Left [] Direct  [] Indirect [] Hands-On  [] IASTM  [] Hypervolt     [] Pec Major  [] Right  [] Left [] Direct  [] Indirect [] Hands-On  [] IASTM  [] Hypervolt        Specific Instructions for next treatment: scapular strengthening, manual as needed       Treatment Charges: Mins Units Time In/Out  (WC/BCBS [place])   Ther Exercise 25 2 10:50am-11:15am   Manual Therapy 15 1 10:35am-10:50am   Vasocompression      Neuro Re-ed      Ther Activity       Gait            Total Billable time 40 3 10:35am-11:15am       Assessment: [x] Progressing toward goals. Initiated session with manual this date and progressed her strength program. Patient improving in her ability to self-correct shoulder elevation with mirror feedback. Provided her with an updated resistance band this date. Encouraged continued postural awareness and proper cervical stretching.     [] No change.     [] Other:  [x] Patient would continue to benefit from skilled physical therapy services in order to

## 2025-04-29 NOTE — TELEPHONE ENCOUNTER
Called and Let the patient know the results of her Xray.     She expressed understanding.     ----- Message from Summer Garcia PA-C sent at 4/29/2025  7:45 AM EDT -----  Let patient know moderate arthritic changes of the cervical spine noted

## 2025-05-01 ENCOUNTER — HOSPITAL ENCOUNTER (OUTPATIENT)
Dept: PHYSICAL THERAPY | Facility: CLINIC | Age: 70
Setting detail: THERAPIES SERIES
Discharge: HOME | End: 2025-05-01
Payer: MEDICARE

## 2025-05-01 PROCEDURE — 97110 THERAPEUTIC EXERCISES: CPT

## 2025-05-01 PROCEDURE — 97140 MANUAL THERAPY 1/> REGIONS: CPT

## 2025-05-01 NOTE — FLOWSHEET NOTE
[facility]  Outpatient Rehabilitation &  Therapy  [address] [facility]   [address]   P: [phone]  F: [phone]      Physical Therapy Daily Treatment Note    Date:  2025  Patient Name:  Viktoriya Kohli    :  1955  MRN: [id number]  Physician: MARY ALICE Holden             Insurance: Medicare (secondary BCBS, vs BMN)  Medical Diagnosis:  Rotator cuff syndrome of left shoulder (M75.102)  Trigger point of shoulder region, left (M25.512)           Cervical pain (neck) (M54.2)              Rehab Codes: M62.81 , M25.512 , M54.2,  R29.3   Onset Date: referral 3/3/25                             Next 's appt: 25    Visit# / total visits:      Cancels/No Shows: 0/0    Subjective:    Pain:  [x] Yes  [] No Location: LUE  Pain Rating: (0-10 scale) not rated/10  Pain altered Tx:  [x] No  [] Yes  Action:  Comments: Patient arrived nothing that she already completed all her exercises and stretching this date and just left the gym. Notes no issues in shoulder and minor soreness in cervical area.    Cervical x-ray 25  Findings: AP, lateral, odontoid view x-rays of the cervical spine done in the office today shows moderate disc space narrowing at C4-C5 and mildly at C3-C4 and C5-C6.  Significant facet degenerative changes at those levels is also noted.  No further evidence of fracture, subluxation, dislocation, radiopaque foreign body, radiopaque tumors noted.  Impression: Cervical spondylosis as described above.      Objective:  Today’s Treatment:  Modalities:   Precautions: Standard   Exercise    Reps/ Time Weight/ Level Comments             UBE  2'/2'   Fwd/retro             Corner Pectoral Stretch  3x30\" T/Y               Scapular Retractions  5x5\"       Supine Chin Tucks         Chin tuck with head lifts  x10     Cervical rotation in supine   10x10\"       Scalene Stretch  3x15\"   Bilat    Levator Scap Stretch  3x30\"   Bilat   Upper trap stretch  3x30\"               Prone        
Gout    Gout is a condition that causes painful swelling of the joints. Gout is a type of inflammation of the joints (arthritis). This condition is caused by having too much uric acid in the body. Uric acid is a chemical that forms when the body breaks down substances called purines. Purines are important for building body proteins.  When the body has too much uric acid, sharp crystals can form and build up inside the joints. This causes pain and swelling. Gout attacks can happen quickly and may be very painful (acute gout). Over time, the attacks can affect more joints and become more frequent (chronic gout). Gout can also cause uric acid to build up under the skin and inside the kidneys.  What are the causes?  This condition is caused by too much uric acid in your blood. This can happen because:  · Your kidneys do not remove enough uric acid from your blood. This is the most common cause.  · Your body makes too much uric acid. This can happen with some cancers and cancer treatments. It can also occur if your body is breaking down too many red blood cells (hemolytic anemia).  · You eat too many foods that are high in purines. These foods include organ meats and some seafood. Alcohol, especially beer, is also high in purines.  A gout attack may be triggered by trauma or stress.  What increases the risk?  You are more likely to develop this condition if you:  · Have a family history of gout.  · Are male and middle-aged.  · Are female and have gone through menopause.  · Are obese.  · Frequently drink alcohol, especially beer.  · Are dehydrated.  · Lose weight too quickly.  · Have an organ transplant.  · Have lead poisoning.  · Take certain medicines, including aspirin, cyclosporine, diuretics, levodopa, and niacin.  · Have kidney disease.  · Have a skin condition called psoriasis.  What are the signs or symptoms?  An attack of acute gout happens quickly. It usually occurs in just one joint. The most common place is 
the big toe. Attacks often start at night. Other joints that may be affected include joints of the feet, ankle, knee, fingers, wrist, or elbow. Symptoms of this condition may include:  · Severe pain.  · Warmth.  · Swelling.  · Stiffness.  · Tenderness. The affected joint may be very painful to touch.  · Shiny, red, or purple skin.  · Chills and fever.  Chronic gout may cause symptoms more frequently. More joints may be involved. You may also have white or yellow lumps (tophi) on your hands or feet or in other areas near your joints.  How is this diagnosed?  This condition is diagnosed based on your symptoms, medical history, and physical exam. You may have tests, such as:  · Blood tests to measure uric acid levels.  · Removal of joint fluid with a thin needle (aspiration) to look for uric acid crystals.  · X-rays to look for joint damage.  How is this treated?  Treatment for this condition has two phases: treating an acute attack and preventing future attacks. Acute gout treatment may include medicines to reduce pain and swelling, including:  · NSAIDs.  · Steroids. These are strong anti-inflammatory medicines that can be taken by mouth (orally) or injected into a joint.  · Colchicine. This medicine relieves pain and swelling when it is taken soon after an attack. It can be given by mouth or through an IV.  Preventive treatment may include:  · Daily use of smaller doses of NSAIDs or colchicine.  · Use of a medicine that reduces uric acid levels in your blood.  · Changes to your diet. You may need to see a dietitian about what to eat and drink to prevent gout.  Follow these instructions at home:  During a gout attack    · If directed, put ice on the affected area:  ? Put ice in a plastic bag.  ? Place a towel between your skin and the bag.  ? Leave the ice on for 20 minutes, 2-3 times a day.  · Raise (elevate) the affected joint above the level of your heart as often as possible.  · Rest the joint as much as possible. 
If the affected joint is in your leg, you may be given crutches to use.  · Follow instructions from your health care provider about eating or drinking restrictions.    Avoiding future gout attacks  · Follow a low-purine diet as told by your dietitian or health care provider. Avoid foods and drinks that are high in purines, including liver, kidney, anchovies, asparagus, herring, mushrooms, mussels, and beer.  · Maintain a healthy weight or lose weight if you are overweight. If you want to lose weight, talk with your health care provider. It is important that you do not lose weight too quickly.  · Start or maintain an exercise program as told by your health care provider.  Eating and drinking  · Drink enough fluids to keep your urine pale yellow.  · If you drink alcohol:  ? Limit how much you use to:  § 0-1 drink a day for women.  § 0-2 drinks a day for men.  ? Be aware of how much alcohol is in your drink. In the [place] one drink equals one 12 oz bottle of beer (355 mL) one 5 oz glass of wine (148 mL), or one 1½ oz glass of hard liquor (44 mL).  General instructions  · Take over-the-counter and prescription medicines only as told by your health care provider.  · Do not drive or use heavy machinery while taking prescription pain medicine.  · Return to your normal activities as told by your health care provider. Ask your health care provider what activities are safe for you.  · Keep all follow-up visits as told by your health care provider. This is important.  Contact a health care provider if you have:  · Another gout attack.  · Continuing symptoms of a gout attack after 10 days of treatment.  · Side effects from your medicines.  · Chills or a fever.  · Burning pain when you urinate.  · Pain in your lower back or belly.  Get help right away if you:  · Have severe or uncontrolled pain.  · Cannot urinate.  Summary  · Gout is painful swelling of the joints caused by inflammation.  · The most common site of pain is the big 
toe, but it can affect other joints in the body.  · Medicines and dietary changes can help to prevent and treat gout attacks.  This information is not intended to replace advice given to you by your health care provider. Make sure you discuss any questions you have with your health care provider.  Document Revised: 07/10/2019 Document Reviewed: 07/10/2019  [employer] Patient Education © 2021 [employer]    Obesity, Adult  Obesity is the condition of having too much total body fat. Being overweight or obese means that your weight is greater than what is considered healthy for your body size. Obesity is determined by a measurement called BMI. BMI is an estimate of body fat and is calculated from height and weight. For adults, a BMI of 30 or higher is considered obese.  Obesity can lead to other health concerns and major illnesses, including:  · Stroke.  · Coronary artery disease (CAD).  · Type 2 diabetes.  · Some types of cancer, including cancers of the colon, breast, uterus, and gallbladder.  · Osteoarthritis.  · High blood pressure (hypertension).  · High cholesterol.  · Sleep apnea.  · Gallbladder stones.  · Infertility problems.  What are the causes?  Common causes of this condition include:  · Eating daily meals that are high in calories, sugar, and fat.  · Being born with genes that may make you more likely to become obese.  · Having a medical condition that causes obesity, including:  ? Hypothyroidism.  ? Polycystic ovarian syndrome (PCOS).  ? Binge-eating disorder.  ? Cushing syndrome.  · Taking certain medicines, such as steroids, antidepressants, and seizure medicines.  · Not being physically active (sedentary lifestyle).  · Not getting enough sleep.  · Drinking high amounts of sugar-sweetened beverages, such as soft drinks.  What increases the risk?  The following factors may make you more likely to develop this condition:  · Having a family history of obesity.  · Being a woman of  
descent.  · Being a man of  descent.  · Living in an area with limited access to:  ? [place], recreation centers, or sidewalks.  ? Healthy food choices, such as grocery stores and [employer] markets.  What are the signs or symptoms?  The main sign of this condition is having too much body fat.  How is this diagnosed?  This condition is diagnosed based on:  · Your BMI. If you are an adult with a BMI of 30 or higher, you are considered obese.  · Your waist circumference. This measures the distance around your waistline.  · Your skinfold thickness. Your health care provider may gently pinch a fold of your skin and measure it.  You may have other tests to check for underlying conditions.  How is this treated?  Treatment for this condition often includes changing your lifestyle. Treatment may include some or all of the following:  · Dietary changes. This may include developing a healthy meal plan.  · Regular physical activity. This may include activity that causes your heart to beat faster (aerobic exercise) and strength training. Work with your health care provider to design an exercise program that works for you.  · Medicine to help you lose weight if you are unable to lose 1 pound a week after 6 weeks of healthy eating and more physical activity.  · Treating conditions that cause the obesity (underlying conditions).  · Surgery. Surgical options may include gastric banding and gastric bypass. Surgery may be done if:  ? Other treatments have not helped to improve your condition.  ? You have a BMI of 40 or higher.  ? You have life-threatening health problems related to obesity.  Follow these instructions at home:  Eating and drinking    · Follow recommendations from your health care provider about what you eat and drink. Your health care provider may advise you to:  ? Limit fast food, sweets, and processed snack foods.  ? Choose low-fat options, such as low-fat milk instead of whole milk.  ? Eat 5 or more servings of 
fruits or vegetables every day.  ? Eat at home more often. This gives you more control over what you eat.  ? Choose healthy foods when you eat out.  ? Learn to read food labels. This will help you understand how much food is considered 1 serving.  ? Learn what a healthy serving size is.  ? Keep low-fat snacks available.  ? Limit sugary drinks, such as soda, fruit juice, sweetened iced tea, and flavored milk.  · Drink enough water to keep your urine pale yellow.  · Do not follow a fad diet. Fad diets can be unhealthy and even dangerous.    Physical activity  · Exercise regularly, as told by your health care provider.  ? Most adults should get up to 150 minutes of moderate-intensity exercise every week.  ? Ask your health care provider what types of exercise are safe for you and how often you should exercise.  · Warm up and stretch before being active.  · Cool down and stretch after being active.  · Rest between periods of activity.  Lifestyle  · Work with your health care provider and a dietitian to set a weight-loss goal that is healthy and reasonable for you.  · Limit your screen time.  · Find ways to reward yourself that do not involve food.  · Do not drink alcohol if:  ? Your health care provider tells you not to drink.  ? You are pregnant, may be pregnant, or are planning to become pregnant.  · If you drink alcohol:  ? Limit how much you use to:  § 0-1 drink a day for women.  § 0-2 drinks a day for men.  ? Be aware of how much alcohol is in your drink. In the [place] one drink equals one 12 oz bottle of beer (355 mL), one 5 oz glass of wine (148 mL), or one 1½ oz glass of hard liquor (44 mL).  General instructions  · Keep a weight-loss journal to keep track of the food you eat and how much exercise you get.  · Take over-the-counter and prescription medicines only as told by your health care provider.  · Take vitamins and supplements only as told by your health care provider.  · Consider joining a support group. 
"Your health care provider may be able to recommend a support group.  · Keep all follow-up visits as told by your health care provider. This is important.  Contact a health care provider if:  · You are unable to meet your weight loss goal after 6 weeks of dietary and lifestyle changes.  Get help right away if you are having:  · Trouble breathing.  · Suicidal thoughts or behaviors.  Summary  · Obesity is the condition of having too much total body fat.  · Being overweight or obese means that your weight is greater than what is considered healthy for your body size.  · Work with your health care provider and a dietitian to set a weight-loss goal that is healthy and reasonable for you.  · Exercise regularly, as told by your health care provider. Ask your health care provider what types of exercise are safe for you and how often you should exercise.  This information is not intended to replace advice given to you by your health care provider. Make sure you discuss any questions you have with your health care provider.  Document Revised: 08/22/2019 Document Reviewed: 08/22/2019  [employer] Patient Education © 2021 [employer]    Hypertension, Adult  High blood pressure (hypertension) is when the force of blood pumping through the arteries is too strong. The arteries are the blood vessels that carry blood from the heart throughout the body. Hypertension forces the heart to work harder to pump blood and may cause arteries to become narrow or stiff. Untreated or uncontrolled hypertension can cause a heart attack, heart failure, a stroke, kidney disease, and other problems.  A blood pressure reading consists of a higher number over a lower number. Ideally, your blood pressure should be below 120/80. The first (\"top\") number is called the systolic pressure. It is a measure of the pressure in your arteries as your heart beats. The second (\"bottom\") number is called the diastolic pressure. It is a measure of the pressure in your "
arteries as the heart relaxes.  What are the causes?  The exact cause of this condition is not known. There are some conditions that result in or are related to high blood pressure.  What increases the risk?  Some risk factors for high blood pressure are under your control. The following factors may make you more likely to develop this condition:  · Smoking.  · Having type 2 diabetes mellitus, high cholesterol, or both.  · Not getting enough exercise or physical activity.  · Being overweight.  · Having too much fat, sugar, calories, or salt (sodium) in your diet.  · Drinking too much alcohol.  Some risk factors for high blood pressure may be difficult or impossible to change. Some of these factors include:  · Having chronic kidney disease.  · Having a family history of high blood pressure.  · Age. Risk increases with age.  · Race. You may be at higher risk if you are .  · Gender. Men are at higher risk than women before age 45. After age 65, women are at higher risk than men.  · Having obstructive sleep apnea.  · Stress.  What are the signs or symptoms?  High blood pressure may not cause symptoms. Very high blood pressure (hypertensive crisis) may cause:  · Headache.  · Anxiety.  · Shortness of breath.  · Nosebleed.  · Nausea and vomiting.  · Vision changes.  · Severe chest pain.  · Seizures.  How is this diagnosed?  This condition is diagnosed by measuring your blood pressure while you are seated, with your arm resting on a flat surface, your legs uncrossed, and your feet flat on the floor. The cuff of the blood pressure monitor will be placed directly against the skin of your upper arm at the level of your heart. It should be measured at least twice using the same arm. Certain conditions can cause a difference in blood pressure between your right and left arms.  Certain factors can cause blood pressure readings to be lower or higher than normal for a short period of time:  · When your blood 
pressure is higher when you are in a health care provider's office than when you are at home, this is called white coat hypertension. Most people with this condition do not need medicines.  · When your blood pressure is higher at home than when you are in a health care provider's office, this is called masked hypertension. Most people with this condition may need medicines to control blood pressure.  If you have a high blood pressure reading during one visit or you have normal blood pressure with other risk factors, you may be asked to:  · Return on a different day to have your blood pressure checked again.  · Monitor your blood pressure at home for 1 week or longer.  If you are diagnosed with hypertension, you may have other blood or imaging tests to help your health care provider understand your overall risk for other conditions.  How is this treated?  This condition is treated by making healthy lifestyle changes, such as eating healthy foods, exercising more, and reducing your alcohol intake. Your health care provider may prescribe medicine if lifestyle changes are not enough to get your blood pressure under control, and if:  · Your systolic blood pressure is above 130.  · Your diastolic blood pressure is above 80.  Your personal target blood pressure may vary depending on your medical conditions, your age, and other factors.  Follow these instructions at home:  Eating and drinking    · Eat a diet that is high in fiber and potassium, and low in sodium, added sugar, and fat. An example eating plan is called the DASH (Dietary Approaches to Stop Hypertension) diet. To eat this way:  ? Eat plenty of fresh fruits and vegetables. Try to fill one half of your plate at each meal with fruits and vegetables.  ? Eat whole grains, such as whole-wheat pasta, brown rice, or whole-grain bread. Fill about one fourth of your plate with whole grains.  ? Eat or drink low-fat dairy products, such as skim milk or low-fat 
yogurt.  ? Avoid fatty cuts of meat, processed or cured meats, and poultry with skin. Fill about one fourth of your plate with lean proteins, such as fish, chicken without skin, beans, eggs, or tofu.  ? Avoid pre-made and processed foods. These tend to be higher in sodium, added sugar, and fat.  · Reduce your daily sodium intake. Most people with hypertension should eat less than 1,500 mg of sodium a day.  · Do not drink alcohol if:  ? Your health care provider tells you not to drink.  ? You are pregnant, may be pregnant, or are planning to become pregnant.  · If you drink alcohol:  ? Limit how much you use to:  § 0-1 drink a day for women.  § 0-2 drinks a day for men.  ? Be aware of how much alcohol is in your drink. In the [place] one drink equals one 12 oz bottle of beer (355 mL), one 5 oz glass of wine (148 mL), or one 1½ oz glass of hard liquor (44 mL).    Lifestyle    · Work with your health care provider to maintain a healthy body weight or to lose weight. Ask what an ideal weight is for you.  · Get at least 30 minutes of exercise most days of the week. Activities may include walking, swimming, or biking.  · Include exercise to strengthen your muscles (resistance exercise), such as Pilates or lifting weights, as part of your weekly exercise routine. Try to do these types of exercises for 30 minutes at least 3 days a week.  · Do not use any products that contain nicotine or tobacco, such as cigarettes, e-cigarettes, and chewing tobacco. If you need help quitting, ask your health care provider.  · Monitor your blood pressure at home as told by your health care provider.  · Keep all follow-up visits as told by your health care provider. This is important.    Medicines  · Take over-the-counter and prescription medicines only as told by your health care provider. Follow directions carefully. Blood pressure medicines must be taken as prescribed.  · Do not skip doses of blood pressure medicine. Doing this puts you at 
risk for problems and can make the medicine less effective.  · Ask your health care provider about side effects or reactions to medicines that you should watch for.  Contact a health care provider if you:  · Think you are having a reaction to a medicine you are taking.  · Have headaches that keep coming back (recurring).  · Feel dizzy.  · Have swelling in your ankles.  · Have trouble with your vision.  Get help right away if you:  · Develop a severe headache or confusion.  · Have unusual weakness or numbness.  · Feel faint.  · Have severe pain in your chest or abdomen.  · Vomit repeatedly.  · Have trouble breathing.  Summary  · Hypertension is when the force of blood pumping through your arteries is too strong. If this condition is not controlled, it may put you at risk for serious complications.  · Your personal target blood pressure may vary depending on your medical conditions, your age, and other factors. For most people, a normal blood pressure is less than 120/80.  · Hypertension is treated with lifestyle changes, medicines, or a combination of both. Lifestyle changes include losing weight, eating a healthy, low-sodium diet, exercising more, and limiting alcohol.  This information is not intended to replace advice given to you by your health care provider. Make sure you discuss any questions you have with your health care provider.  Document Revised: 08/28/2019 Document Reviewed: 08/28/2019  [employer] Patient Education © 2021 [employer]    
4 = No assist / stand by assistance

## 2025-05-07 ENCOUNTER — HOSPITAL ENCOUNTER (OUTPATIENT)
Dept: PHYSICAL THERAPY | Facility: CLINIC | Age: 70
Setting detail: THERAPIES SERIES
Discharge: HOME | End: 2025-05-07
Payer: MEDICARE

## 2025-05-07 PROCEDURE — 97110 THERAPEUTIC EXERCISES: CPT

## 2025-05-07 PROCEDURE — 97140 MANUAL THERAPY 1/> REGIONS: CPT

## 2025-05-07 NOTE — PROGRESS NOTES
Patient to demonstrate improvement in dynamic posture with minimal to no cueing throughout session on shoulder elevation  [x]  []  []      4. ? Strength: Increase UE MMT to 5/5 throughout to ease functional limitations and mobility  [x]  []  []      5. Independent with Home Exercise Programs [x]  []  []        []  []  []        []  []  []      Date Addressed:            LTG: To be met in 16 treatments           1. Improve score on assessment tool QuickDASH from 50% impairment to less than 35% impairment  []  []  []      2. Reduce pain levels to 0/10 or less with ADLs []  []  []      3. Patient to report ability to lay on her left side without difficulty or pain  []  []  []                      Patient goals: pain relief      Rehab Potential:  [x] Good  [] Fair  [] Poor   Suggested Professional Referral:  [x] No  [] Yes:  Barriers to Goal Achievement:  [x] No  [] Yes:  Domestic Concerns:  [x] No  [] Yes:     Pt. Education:    [x] Home exercise program  Method of Education: [x] Verbal  [x] Demo  [] Written     Access Code: [email or web address]  URL: [email or web address]  Date: 04/08/2025  Prepared by: Peggy Pena     Exercises  - Supine Chin Tuck  - 2-3 x daily - 7 x weekly - 1 sets - 10 reps - 5 secs hold  - Supine Cervical Rotation AROM on Pillow  - 2 x daily - 7 x weekly - 1 sets - 10 reps  - Supine Diaphragmatic Breathing  - 1 x daily - 7 x weekly - 3 sets - 10 reps  - Seated Scapular Retraction  - 2 x daily - 7 x weekly - 3 sets - 10 reps - 5 hold  - Seated Scalene Stretch with Towel (Mirrored)  - 1 x daily - 7 x weekly - 3 sets - 20 sec hold  - Gentle Levator Scapulae Stretch (Mirrored)  - 1 x daily - 7 x weekly - 3 sets - 20 sec hold  - Seated Scalene Stretch with Towel  - 1 x daily - 7 x weekly - 3 sets - 20 sec hold  - Gentle Levator Scapulae Stretch  - 1 x daily - 7 x weekly - 3 sets - 20 sec hold     Comprehension of Education:  [x] Verbalizes understanding.  [x] Demonstrates understanding.  [x] Needs

## 2025-05-09 ENCOUNTER — HOSPITAL ENCOUNTER (OUTPATIENT)
Dept: PHYSICAL THERAPY | Facility: CLINIC | Age: 70
Setting detail: THERAPIES SERIES
Discharge: HOME | End: 2025-05-09
Payer: MEDICARE

## 2025-05-09 PROCEDURE — 97140 MANUAL THERAPY 1/> REGIONS: CPT

## 2025-05-09 NOTE — PROGRESS NOTES
[facility]  Outpatient Rehabilitation &  Therapy  [address]   [address]   P: [phone]  F: [phone]      Physical Therapy Daily Treatment Note    Date:  2025  Patient Name:  Viktoriya Kohli    :  1955  MRN: [id number]  Physician: MARY ALICE Holden             Insurance: Medicare (secondary BCBS, vs BMN)  Medical Diagnosis:  Rotator cuff syndrome of left shoulder (M75.102)  Trigger point of shoulder region, left (M25.512)           Cervical pain (neck) (M54.2)              Rehab Codes: M62.81 , M25.512 , M54.2,  R29.3   Onset Date: referral 3/3/25                             Next 's appt: 25    Visit# / total visits: 10/16     Cancels/No Shows: 0/0    Subjective:    Pain:  [x] Yes  [] No Location: LUE  Pain Rating: (0-10 scale) not rated/10  Pain altered Tx:  [x] No  [] Yes  Action:  Comments: Patient arrived noting increased tightness on R side of neck this date.    Objective:  Today’s Treatment:  Modalities:   Precautions: Standard   Exercise    Reps/ Time Weight/ Level Comments             UBE  2'/2'   Fwd/retro             Corner Pectoral Stretch  HEP T/Y               Scapular Retractions          Supine Chin Tucks         Chin tuck with head lifts  x10     Cervical rotation in supine   HEP       Scalene Stretch  3x15\"   Bilat    Levator Scap Stretch  3x30\"   Bilat   Upper trap stretch  3x30\"                Lower trap raises at wall  2x10                             Rows  2x10 1#    Extension  2x10 1#    HAB  2x10 A    Scaption with wand   Unable           Sidelying       Scapular elevation/depression  x15  Following manual this date   Abduction  x10 2#    ER x15 2#          Quadruped cervical retraction       Quadruped scapular retraction/protraction    Difficult today           TBand          Rows    Blue     Extension   Green     IR  Blue    ER  Blue     Bilat horiz abd   Green      Bilateral ER   Green      Bicep curls  x20 2# DB          Flexion 2x15 2#

## 2025-05-12 ENCOUNTER — HOSPITAL ENCOUNTER (OUTPATIENT)
Dept: PHYSICAL THERAPY | Facility: CLINIC | Age: 70
Setting detail: THERAPIES SERIES
Discharge: HOME | End: 2025-05-12
Payer: MEDICARE

## 2025-05-12 PROCEDURE — 97110 THERAPEUTIC EXERCISES: CPT

## 2025-05-12 PROCEDURE — 97140 MANUAL THERAPY 1/> REGIONS: CPT

## 2025-05-12 NOTE — FLOWSHEET NOTE
[facility]  Outpatient Rehabilitation &  Therapy  [address]   [address]   P: [phone]  F: [phone]      Physical Therapy Daily Treatment Note    Date:  2025  Patient Name:  Viktoriya Kohli    :  1955  MRN: [id number]  Physician: MARY ALICE Holden             Insurance: Medicare (secondary BCBS, vs BMN)  Medical Diagnosis:  Rotator cuff syndrome of left shoulder (M75.102)  Trigger point of shoulder region, left (M25.512)           Cervical pain (neck) (M54.2)              Rehab Codes: M62.81 , M25.512 , M54.2,  R29.3   Onset Date: referral 3/3/25                             Next 's appt: 25  Visit# / total visits:      Cancels/No Shows: 0/0    Subjective:    Pain:  [x] Yes  [] No Location: LUE  Pain Rating: (0-10 scale) not rated/10  Pain altered Tx:  [x] No  [] Yes  Action:  Comments: Patient arrives with continued stiffness into her neck. Her shoulder has not been bother her. Completed her stretch as well as [employer] exercises today already.     Objective:  Today’s Treatment:  Modalities:   Precautions: Standard   Exercise    Reps/ Time Weight/ Level Comments             UBE  2'/2'   Fwd/retro             Corner Pectoral Stretch  HEP T/Y               Scapular Retractions          Supine Chin Tucks         Chin tuck with head lifts  x10     Cervical rotation in supine   HEP       Scalene Stretch  3x15\"   Bilat    Levator Scap Stretch  3x30\"   Bilat   Upper trap stretch  3x30\"               Lower trap raises at wall  2x10       Shoulder shrugs  x20 3#                   Rows  2x10 3#    Extension  2x10 3#    HAB  2x10 1#    Scaption with wand               Sidelying       Scapular elevation/depression    Following manual this date   Abduction   2#    ER  2#          Quadruped cervical retraction  10x10\"     Quadruped scapular retraction/protraction  x20  Difficult today           TBand          Rows  HEP Blue     Extension  HEP Green     IR HEP Blue    ER

## 2025-05-13 ENCOUNTER — APPOINTMENT (OUTPATIENT)
Dept: PHYSICAL THERAPY | Facility: CLINIC | Age: 70
End: 2025-05-13
Payer: MEDICARE

## 2025-05-16 ENCOUNTER — HOSPITAL ENCOUNTER (OUTPATIENT)
Dept: PHYSICAL THERAPY | Facility: CLINIC | Age: 70
Setting detail: THERAPIES SERIES
Discharge: HOME | End: 2025-05-16
Payer: MEDICARE

## 2025-05-16 NOTE — FLOWSHEET NOTE
[] [facility]  Outpatient Rehabilitation &  Therapy  [address]  [phone]  [phone] [] [facility]  Outpatient Rehabilitation &  Therapy  [address]  P: [phone]  F: [phone] [] [facility]  Outpatient Rehabilitation &  Therapy  [address]  P: [phone]  F: [phone] [] [facility]  Outpatient Rehabilitation &  Therapy  [address]  [phone]  [phone] [x] [facility]  Outpatient Rehabilitation &  Therapy  [address]   P: [phone]  F: [phone]  [] [facility]  Outpatient Rehabilitation &  Therapy  [address]  P: [phone]  F: [phone] [] [facility]  Outpatient Rehabilitation &  Therapy  [address]  P: [phone]  F: [phone] [] [facility]  Outpatient Rehabilitation &  Therapy  [address]  P: [phone]  F: [phone] [] [facility]  Outpatient Rehabilitation &  Therapy  [address]  P: [phone]  F: [phone]  [] [facility] Outpatient Rehabilitation &  Therapy  [address]  P: [phone]  F: [phone]     Therapy Cancel/No Show note    Date: 2025  Patient: Viktoriya Kohli  : 1955  MRN: [id number]    Cancels/No Shows to date: 0    For today's appointment patient:    [x]  Cancelled    [] Rescheduled appointment    [] No-show     Reason given by patient:    []  Patient ill    []  Conflicting appointment    [] No transportation      [] Conflict with work    [] No reason given    [] Weather related    [] COVID-19    [] Other:      Comments:        [x] Next appointment was confirmed    Electronically signed by: Amira Skelton

## 2025-05-22 ENCOUNTER — HOSPITAL ENCOUNTER (OUTPATIENT)
Dept: PHYSICAL THERAPY | Facility: CLINIC | Age: 70
Setting detail: THERAPIES SERIES
Discharge: HOME | End: 2025-05-22
Payer: MEDICARE

## 2025-05-22 NOTE — FLOWSHEET NOTE
[] [facility]  Outpatient Rehabilitation &  Therapy  [address]  [phone]  [phone] [] [facility]  Outpatient Rehabilitation &  Therapy  [address]  P: [phone]  F: [phone] [] [facility]  Outpatient Rehabilitation &  Therapy  [address]  P: [phone]  F: [phone] [] [facility]  Outpatient Rehabilitation &  Therapy  [address]  [phone]  [phone] [x] [facility]  Outpatient Rehabilitation &  Therapy  [address]   P: [phone]  F: [phone]  [] [facility]  Outpatient Rehabilitation &  Therapy  [address]  P: [phone]  F: [phone] [] [facility]  Outpatient Rehabilitation &  Therapy  [address]  P: [phone]  F: [phone] [] [facility]  Outpatient Rehabilitation &  Therapy  [address]  P: [phone]  F: [phone] [] [facility]  Outpatient Rehabilitation &  Therapy  [address]  P: [phone]  F: [phone]  [] [facility] Outpatient Rehabilitation &  Therapy  [address]  P: [phone]  F: [phone]     Therapy Cancel/No Show note    Date: 2025  Patient: Viktoriya Kohli  : 1955  MRN: [id number]    Cancels/No Shows to date: 3/0    For today's appointment patient:    [x]  Cancelled    [] Rescheduled appointment    [] No-show     Reason given by patient:    [x]  Patient ill    []  Conflicting appointment    [] No transportation      [] Conflict with work    [] No reason given    [] Weather related    [] COVID-19    [] Other:      Comments:        [x] Next appointment was confirmed    Electronically signed by: Matt Masters PT

## 2025-05-28 ENCOUNTER — APPOINTMENT (OUTPATIENT)
Dept: PHYSICAL THERAPY | Facility: CLINIC | Age: 70
End: 2025-05-28
Payer: MEDICARE

## 2025-06-04 ENCOUNTER — HOSPITAL ENCOUNTER (OUTPATIENT)
Dept: PHYSICAL THERAPY | Facility: CLINIC | Age: 70
Setting detail: THERAPIES SERIES
Discharge: HOME | End: 2025-06-04

## 2025-06-04 NOTE — FLOWSHEET NOTE
[] [facility]  Outpatient Rehabilitation &  Therapy  [address]  [phone]  [phone] [] [facility]  Outpatient Rehabilitation &  Therapy  [address]  P: [phone]  F: [phone] [] [facility]  Outpatient Rehabilitation &  Therapy  [address]  P: [phone]  F: [phone] [] [facility]  Outpatient Rehabilitation &  Therapy  [address]  [phone]  [phone] [x] [facility]  Outpatient Rehabilitation &  Therapy  [address]   P: [phone]  F: [phone]  [] [facility]  Outpatient Rehabilitation &  Therapy  [address]  P: [phone]  F: [phone] [] [facility]  Outpatient Rehabilitation &  Therapy  [address]  P: [phone]  F: [phone] [] [facility]  Outpatient Rehabilitation &  Therapy  [address]  P: [phone]  F: [phone] [] [facility]  Outpatient Rehabilitation &  Therapy  [address]  P: [phone]  F: [phone]  [] [facility] Outpatient Rehabilitation &  Therapy  [address]  P: [phone]  F: [phone] [] [facility] Outpatient Rehabilitation &  Therapy  [address]  P: [phone]   F: [phone]      Therapy Cancel/No Show note    Date: 2025  Patient: Viktoriya Kohli  : 1955  MRN: [id number]    Cancels/No Shows to date:     For today's appointment patient:    [x]  Cancelled    [] Rescheduled appointment    [] No-show     Reason given by patient:    []  Patient ill    []  Conflicting appointment    [] No transportation      [] Conflict with work    [x] No reason given    [] Weather related    [] COVID-19    [] Other:      Comments:        [] Next appointment was confirmed    Electronically